# Patient Record
Sex: FEMALE | Race: WHITE | NOT HISPANIC OR LATINO | URBAN - METROPOLITAN AREA
[De-identification: names, ages, dates, MRNs, and addresses within clinical notes are randomized per-mention and may not be internally consistent; named-entity substitution may affect disease eponyms.]

---

## 2017-01-23 ENCOUNTER — IMPORTED ENCOUNTER (OUTPATIENT)
Dept: URBAN - METROPOLITAN AREA CLINIC 31 | Facility: CLINIC | Age: 67
End: 2017-01-23

## 2017-01-23 PROBLEM — H40.1111: Noted: 2017-01-23

## 2017-01-23 PROBLEM — H25.043: Noted: 2017-01-23

## 2017-01-23 PROBLEM — H40.1122: Noted: 2017-01-23

## 2017-01-23 PROBLEM — H43.811: Noted: 2017-01-23

## 2017-01-23 PROCEDURE — 92004 COMPRE OPH EXAM NEW PT 1/>: CPT

## 2017-01-23 NOTE — PATIENT DISCUSSION
1.  Discussed the risks benefits alternatives and limitations of cataract surgery including infection bleeding loss of vision retinal tears detachment. The patient stated a full understanding and a desire to proceed with the procedure in both eyes. Refractive options were reviewed. Patient has elected to be optomized for distance vision in both eyes. The patient will still need glasses for reading and to possibly fine tune distance vision. Standard vs MFIOL discussed. Can only do MFIOL if VF shows mild changes. Pt having side effects with Travatan would recommend iStent at time of cataract surgery. Schedule KPE/IOL OS/OD with iStent get glaucoma records VF OCT2. Primary open angle glaucoma OD:  Continue with current treatment plan. Discussed importance of compliance. Will continue to monitor. 3.  Primary open angle glaucoma OS - Continue with current treatment plan. Discussed importance of compliance. Will continue to monitor. 4.  PVD OD: Patient was cautioned to call our office immediately if they experience a substantial change in their symptoms such as an increase in floaters persistent flashes loss of visual field (may appear as a shadow or a curtain) or decrease in visual acuity as these may indicate a retinal tear or detachment. If this is a new problem patient will need to return for re-examination  as determined by the [address] [place] [address]. Return for an appointment for [address] [place] [address] with Dr. Devin Khan.

## 2017-02-07 ENCOUNTER — IMPORTED ENCOUNTER (OUTPATIENT)
Dept: URBAN - METROPOLITAN AREA CLINIC 31 | Facility: CLINIC | Age: 67
End: 2017-02-07

## 2017-02-07 PROBLEM — H25.813: Noted: 2017-02-07

## 2017-02-07 PROCEDURE — 92286 ANT SGM IMG I&R SPECLR MIC: CPT

## 2017-02-07 PROCEDURE — 92134 CPTRZ OPH DX IMG PST SGM RTA: CPT

## 2017-02-07 PROCEDURE — 76519 ECHO EXAM OF EYE: CPT

## 2017-02-07 NOTE — PATIENT DISCUSSION
Discussed the risks benefits alternatives and limitations of cataract surgery including infection bleeding loss of vision retinal tears detachment. The patient stated a full understanding and a desire to proceed with the procedure in the right eye. Refractive options were reviewed. Pt has elected MFIOL with Femto assist and ORA guidance to optimize lens power choice. The pt may still need glasses to possibly fine tune uncorrected vision. The patient understands the risk of glare and halo at night.

## 2017-02-28 ENCOUNTER — IMPORTED ENCOUNTER (OUTPATIENT)
Dept: URBAN - METROPOLITAN AREA CLINIC 31 | Facility: CLINIC | Age: 67
End: 2017-02-28

## 2017-02-28 PROBLEM — Z96.1: Noted: 2017-02-28

## 2017-02-28 PROCEDURE — 99024 POSTOP FOLLOW-UP VISIT: CPT

## 2017-02-28 NOTE — PATIENT DISCUSSION
Post-Op Day #1 - Cataract Surgery Left Eye (OS) - doing well. Tears prn. Continue postop drops as directed. Call office with symptoms of pain redness or decreased vision in operative eye.  1 drop of zylet instilleds/p iStent IOP doing well. Return for an appointment in 1 week for post op exam. with Dr. Francoise Kern.

## 2017-03-07 ENCOUNTER — IMPORTED ENCOUNTER (OUTPATIENT)
Dept: URBAN - METROPOLITAN AREA CLINIC 31 | Facility: CLINIC | Age: 67
End: 2017-03-07

## 2017-03-07 PROBLEM — Z96.1: Noted: 2017-03-07

## 2017-03-07 PROCEDURE — 99024 POSTOP FOLLOW-UP VISIT: CPT

## 2017-03-07 NOTE — PATIENT DISCUSSION
1.  Post-Op Day #1 - Cataract Surgery Right Eye (OD) - doing well. Tears prn. Continue postop drops as directed. Call office with symptoms of pain redness or decreased vision in operative eye. 1 drop zylet instilled. 2. Return for an appointment in 1 week for post op exam. What to do with Dr. Mari Vaughan.  CHECK UNCORRECTED READING AND DISTANCE

## 2017-03-14 ENCOUNTER — IMPORTED ENCOUNTER (OUTPATIENT)
Dept: URBAN - METROPOLITAN AREA CLINIC 31 | Facility: CLINIC | Age: 67
End: 2017-03-14

## 2017-03-14 PROBLEM — Z96.1: Noted: 2017-03-14

## 2017-03-14 PROBLEM — H40.1131: Noted: 2017-03-14

## 2017-03-14 PROCEDURE — 99024 POSTOP FOLLOW-UP VISIT: CPT

## 2017-03-14 NOTE — PATIENT DISCUSSION
1.  Post-Op Week #1 - Cataract Surgery Right Eye (OD) - Intraocular lens stable and surgery very well healed. Patient to resume all normal activities. Finish postop drops as directed. Final Refraction given if necessary. Add ATS 3-4xd recheck mrx in 1 monthCont. Travatan for 2 more weeks then DC2. Post-Op Week #2 - Cataract Surgery Left Eye (OS) -  Intraocular lens stable and surgery very well healed. Patient to resume all normal activities. Finish postop drops as directed. Final Refraction given if necessary. Cont. Travatan for 2 more weeks then [facility]. Primary open angle glaucoma OU - Continue with current treatment plan. Discussed importance of compliance. Will continue to monitor. s/p iStent continue Travatan for 2 weeks then trial DC reviewed FDA study of iStent. Pt will still need monitoring of glaucoma and yearly VF and OCT4. Return for an appointment in 1 month for post op exam. MRx topography. with Dr. Devin Khan.

## 2017-04-18 ENCOUNTER — IMPORTED ENCOUNTER (OUTPATIENT)
Dept: URBAN - METROPOLITAN AREA CLINIC 31 | Facility: CLINIC | Age: 67
End: 2017-04-18

## 2017-04-18 PROBLEM — Z96.1: Noted: 2017-04-18

## 2017-04-18 PROBLEM — H40.1131: Noted: 2017-04-18

## 2017-04-18 PROCEDURE — 99024 POSTOP FOLLOW-UP VISIT: CPT

## 2017-04-18 NOTE — PATIENT DISCUSSION
1.  Post-Op Cataract Surgery 15-90 days Both Eyes (OU)-  Doing well with stable vision. Tears frequent. 2. Primary open angle glaucoma OU - s/p iStent IOP excellent 3. Return for an appointment in 3 months for dilated fundus exam. with Dr. Santana Ni.

## 2017-07-21 ENCOUNTER — IMPORTED ENCOUNTER (OUTPATIENT)
Dept: URBAN - METROPOLITAN AREA CLINIC 31 | Facility: CLINIC | Age: 67
End: 2017-07-21

## 2017-07-21 PROBLEM — H26.493: Noted: 2017-07-21

## 2017-07-21 PROBLEM — Z96.1: Noted: 2017-07-21

## 2017-07-21 PROCEDURE — 99214 OFFICE O/P EST MOD 30 MIN: CPT

## 2017-07-21 NOTE — PATIENT DISCUSSION
1.  Pseudophakia OU - IOLs stable. Monitor. 2. PCO OU: (Posterior Capsule Opacification)  Not visually significant at this time. Monitor for yag capsulotomy necessity. If striae do not resolve t/c yag. Return for an appointment in 6 weeks for dilated fundus exam. BAT. with Dr. Concetta Kennedy.

## 2017-10-25 ENCOUNTER — IMPORTED ENCOUNTER (OUTPATIENT)
Dept: URBAN - METROPOLITAN AREA CLINIC 31 | Facility: CLINIC | Age: 67
End: 2017-10-25

## 2017-10-25 PROBLEM — H26.493: Noted: 2017-10-25

## 2017-10-25 PROCEDURE — 92134 CPTRZ OPH DX IMG PST SGM RTA: CPT

## 2017-10-25 PROCEDURE — 99214 OFFICE O/P EST MOD 30 MIN: CPT

## 2017-10-25 NOTE — PATIENT DISCUSSION
PCO  OU (Posterior Capsule Opacification)   PCO is visually significant and impairment of vision does not meet the patient’s functional needs or interferes with activities of daily living. Risks benefits and alternatives to the Nd:YAG Laser reviewed including elevated IOP immediately postop and retinal tear/detachment. Patient to notify their ophthalmologist promptly if they have a significant change in symptoms such as flashes of light (photopsia) an increase in floaters loss of visual field or decrease in visual acuity after the procedure. Patient will be scheduled in [place] [id number] for Nd:YAG Laser.  Schedule Yag OD/OS

## 2017-11-21 ENCOUNTER — IMPORTED ENCOUNTER (OUTPATIENT)
Dept: URBAN - METROPOLITAN AREA CLINIC 31 | Facility: CLINIC | Age: 67
End: 2017-11-21

## 2017-11-21 PROBLEM — H40.1131: Noted: 2017-11-21

## 2017-11-21 PROBLEM — Z98.89: Noted: 2017-11-21

## 2017-11-21 PROBLEM — H04.123: Noted: 2017-11-21

## 2017-11-21 PROBLEM — Z96.1: Noted: 2017-11-21

## 2017-11-21 PROCEDURE — 99024 POSTOP FOLLOW-UP VISIT: CPT

## 2017-11-21 NOTE — PATIENT DISCUSSION
1.  s/p YAG laser capsulotomy for Posterior Capsule Opacification (PCO) in Both Eyes (OU). Please contact us if you have a significant change in symptoms such as flashes of light (photopsia) increased floaters decrease/loss of visual field or visual acuity. 2. Dry Eye OU: Pt takes antihistamines for allergies and cannot stop. Start Restasis BID. Discussed that Restasis helps to increase the production of the patient's own tears and therefore can take 3-4 months for an improvement in symptoms. Use AT q3-43. Pseudophakia OU -  residual refractive error affecting vision. Recommend possible PRK need to treat dryness first.Return for an appointment in 1 month for refractive consult. with Dr. Edith Mishra. 4.  Primary open angle glaucoma OU - s/p iStent IOP excellent Discussed importance of compliance. Will continue to monitor.

## 2017-12-26 ENCOUNTER — IMPORTED ENCOUNTER (OUTPATIENT)
Dept: URBAN - METROPOLITAN AREA CLINIC 31 | Facility: CLINIC | Age: 67
End: 2017-12-26

## 2017-12-26 PROBLEM — H40.1131: Noted: 2017-12-26

## 2017-12-26 PROBLEM — Z96.1: Noted: 2017-12-26

## 2017-12-26 PROBLEM — H52.209: Noted: 2017-12-26

## 2017-12-26 PROBLEM — H16.223: Noted: 2017-12-26

## 2017-12-26 NOTE — PATIENT DISCUSSION
1.  We discussed risks vs. benefits of surgery. The patient has watched and understands the informed consent video. I have discussed the risks of laser refractive surgery with the patient. I informed the patient of the risk of infection (1:1000 for [address] ) I have discussed the possibility of postoperative halos starbursts and dryness. I reviewed the risk of corneal ectasia. We discussed the possibility of need for enhancement surgery. I have discussed the need for postoperative reading glasses. I have discussed the possibility for the patient to require glasses and/or contact lenses postoperatively for the clearest vision possible. I have answered all of the patients questions. Schedule PRK OS N/C2. Dry Eye OU:  Continue current management with Restasis. 3.  Pseudophakia OU - IOLs stable. Monitor. 4. Primary open angle glaucoma OU - Continue with current treatment plan. Discussed importance of compliance. Will continue to monitor.

## 2017-12-29 ENCOUNTER — IMPORTED ENCOUNTER (OUTPATIENT)
Dept: URBAN - METROPOLITAN AREA CLINIC 31 | Facility: CLINIC | Age: 67
End: 2017-12-29

## 2017-12-29 PROCEDURE — 66999 UNLISTED PX ANT SEGMENT EYE: CPT

## 2017-12-29 NOTE — PATIENT DISCUSSION
Surgery performed without difficulty. PO drops and instructions reviewed. Pt tolerated well and released to home in good condition. Return for an appointment in 1 day for post op exam. with Dr. Last Rmey.

## 2017-12-30 ENCOUNTER — IMPORTED ENCOUNTER (OUTPATIENT)
Dept: URBAN - METROPOLITAN AREA CLINIC 31 | Facility: CLINIC | Age: 67
End: 2017-12-30

## 2017-12-30 PROBLEM — H52.229: Noted: 2017-12-30

## 2017-12-30 PROCEDURE — 99024 POSTOP FOLLOW-UP VISIT: CPT

## 2017-12-30 NOTE — PATIENT DISCUSSION
s/p [address] doing well. PO drops as directed on instruction sheet. BCL to remain until surface healed tears prn. Call with any problems. Return for an appointment in 1 week for post op exam. with Dr. Francoise Kern.

## 2018-01-03 ENCOUNTER — IMPORTED ENCOUNTER (OUTPATIENT)
Dept: URBAN - METROPOLITAN AREA CLINIC 31 | Facility: CLINIC | Age: 68
End: 2018-01-03

## 2018-01-03 PROCEDURE — 99024 POSTOP FOLLOW-UP VISIT: CPT

## 2018-01-03 NOTE — PATIENT DISCUSSION
s/p [address] doing well. PO drops as directed on instruction sheet. BCL DC today restart restasis on Friday tears prn. Call with any problems. Return for an appointment in 1 week for post op exam. with Dr. Marisela Harman.

## 2018-01-10 ENCOUNTER — IMPORTED ENCOUNTER (OUTPATIENT)
Dept: URBAN - METROPOLITAN AREA CLINIC 31 | Facility: CLINIC | Age: 68
End: 2018-01-10

## 2018-01-10 PROCEDURE — 99024 POSTOP FOLLOW-UP VISIT: CPT

## 2018-01-10 NOTE — PATIENT DISCUSSION
s/p PRK for mild myopia astig post MF IOL. doing well. Mild haze. Increase Lotemak to TID for a week then po in a week. May decrease at that time.

## 2018-01-17 ENCOUNTER — IMPORTED ENCOUNTER (OUTPATIENT)
Dept: URBAN - METROPOLITAN AREA CLINIC 31 | Facility: CLINIC | Age: 68
End: 2018-01-17

## 2018-01-17 PROBLEM — Z96.1: Noted: 2018-01-17

## 2018-01-17 PROCEDURE — 99024 POSTOP FOLLOW-UP VISIT: CPT

## 2018-02-01 ENCOUNTER — IMPORTED ENCOUNTER (OUTPATIENT)
Dept: URBAN - METROPOLITAN AREA CLINIC 31 | Facility: CLINIC | Age: 68
End: 2018-02-01

## 2018-02-01 PROCEDURE — 99024 POSTOP FOLLOW-UP VISIT: CPT

## 2018-03-01 ENCOUNTER — IMPORTED ENCOUNTER (OUTPATIENT)
Dept: URBAN - METROPOLITAN AREA CLINIC 31 | Facility: CLINIC | Age: 68
End: 2018-03-01

## 2018-03-01 PROBLEM — H17.89: Noted: 2018-03-01

## 2018-03-01 PROCEDURE — 99024 POSTOP FOLLOW-UP VISIT: CPT

## 2018-03-01 NOTE — PATIENT DISCUSSION
1.  s/p PRK Discussed in detail why patients vision is still blurry. Explained to make sure to use plenty of light when watching TV and reading. Will restart Lotemax qd OS. F/U one mos re-refract then. Cont ATs restasis. 2.   Refractive error

## 2018-04-12 ENCOUNTER — IMPORTED ENCOUNTER (OUTPATIENT)
Dept: URBAN - METROPOLITAN AREA CLINIC 31 | Facility: CLINIC | Age: 68
End: 2018-04-12

## 2018-04-12 PROBLEM — H17.89: Noted: 2018-04-12

## 2018-04-12 PROCEDURE — 99024 POSTOP FOLLOW-UP VISIT: CPT

## 2018-04-12 NOTE — PATIENT DISCUSSION
s/p [address] doing well. No more steroid. Cont NICKO drops.   OC 3 mos check cornea and IOP (S/P iStent)

## 2018-07-12 ENCOUNTER — IMPORTED ENCOUNTER (OUTPATIENT)
Dept: URBAN - METROPOLITAN AREA CLINIC 31 | Facility: CLINIC | Age: 68
End: 2018-07-12

## 2018-07-12 PROBLEM — H16.223: Noted: 2018-07-12

## 2018-07-12 PROBLEM — Z96.1: Noted: 2018-07-12

## 2018-07-12 PROCEDURE — 99213 OFFICE O/P EST LOW 20 MIN: CPT

## 2018-07-12 NOTE — PATIENT DISCUSSION
1.  Dry Eye OU:  Continue current management with Restasis. 2.  Pseudophakia OU - IOLs stable. Monitor. S/P Multifocal IOL OU Yag OU PRK OS with some regression. Rx SV distance. Dr. Henri pennington prn.3.   Glc with good IOP S/P iStent

## 2018-09-21 ENCOUNTER — IMPORTED ENCOUNTER (OUTPATIENT)
Dept: URBAN - METROPOLITAN AREA CLINIC 31 | Facility: CLINIC | Age: 68
End: 2018-09-21

## 2018-09-21 PROBLEM — Z96.1: Noted: 2018-09-21

## 2018-09-21 PROBLEM — H17.89: Noted: 2018-09-21

## 2018-09-21 PROBLEM — H40.1132: Noted: 2018-09-21

## 2018-09-21 PROBLEM — H04.123: Noted: 2018-09-21

## 2018-09-21 PROCEDURE — 92025 CPTRIZED CORNEAL TOPOGRAPHY: CPT

## 2018-09-21 PROCEDURE — 99213 OFFICE O/P EST LOW 20 MIN: CPT

## 2018-09-21 NOTE — PATIENT DISCUSSION
s/p PRK t/c enhancement when surface is stableReturn for an appointment in 1 month for office call. ARx MRx and Topography. with Dr. Edith Mishra.

## 2018-09-21 NOTE — PATIENT DISCUSSION
1.  Dry Eye OU: STill symptomatic on restasis. Start Xiidra BID. Discussed that Ranjeet Maillard helps to increase the production of the patient's own tears and therefore can take 3-4 months for an improvement in symptoms. Use AT frequent. 2. Pseudophakia OU - IOLs stable. Monitor. ReStor ou vision limited by surface and residual refractive error3.  s/p PRK t/c enhancement when surface is stable4. Glaucoma stable on dropsReturn for an appointment in 1 month for office call. ARx MRx and Topography. with Dr. Alicia Corona.

## 2018-09-21 NOTE — PATIENT DISCUSSION
Pseudophakia OU - IOLs stable. Monitor.  ReStor ou vision limited by surface and residual refractive error

## 2018-10-24 ENCOUNTER — IMPORTED ENCOUNTER (OUTPATIENT)
Dept: URBAN - METROPOLITAN AREA CLINIC 31 | Facility: CLINIC | Age: 68
End: 2018-10-24

## 2018-10-24 PROBLEM — H16.223: Noted: 2018-10-24

## 2018-10-24 PROBLEM — Z96.1: Noted: 2018-10-24

## 2018-10-24 PROCEDURE — 99213 OFFICE O/P EST LOW 20 MIN: CPT

## 2018-10-24 PROCEDURE — 92025 CPTRIZED CORNEAL TOPOGRAPHY: CPT

## 2018-10-24 NOTE — PATIENT DISCUSSION
1.  Dry Eye OU:  Continue current management with Xiidra and tears prn.  2.  Pseudophakia OU - IOLs stable. Monitor. t/c [address] if not better after dryness improvesReturn for an appointment in 6 weeks for refractive consult. with Dr. Terrie Graves.

## 2018-10-24 NOTE — PATIENT DISCUSSION
Pseudophakia OU - IOLs stable. Monitor. t/c [address] if not better after dryness improvesReturn for an appointment in 6 weeks for refractive consult. with Dr. Yarely Payan.

## 2018-11-19 ENCOUNTER — APPOINTMENT (RX ONLY)
Dept: URBAN - METROPOLITAN AREA CLINIC 148 | Facility: CLINIC | Age: 68
Setting detail: DERMATOLOGY
End: 2018-11-19

## 2018-11-19 DIAGNOSIS — D22 MELANOCYTIC NEVI: ICD-10-CM

## 2018-11-19 DIAGNOSIS — Z85.828 PERSONAL HISTORY OF OTHER MALIGNANT NEOPLASM OF SKIN: ICD-10-CM

## 2018-11-19 DIAGNOSIS — Z87.2 PERSONAL HISTORY OF DISEASES OF THE SKIN AND SUBCUTANEOUS TISSUE: ICD-10-CM

## 2018-11-19 DIAGNOSIS — L81.5 LEUKODERMA, NOT ELSEWHERE CLASSIFIED: ICD-10-CM

## 2018-11-19 DIAGNOSIS — L81.4 OTHER MELANIN HYPERPIGMENTATION: ICD-10-CM

## 2018-11-19 DIAGNOSIS — L82.1 OTHER SEBORRHEIC KERATOSIS: ICD-10-CM

## 2018-11-19 PROBLEM — E05.90 THYROTOXICOSIS, UNSPECIFIED WITHOUT THYROTOXIC CRISIS OR STORM: Status: ACTIVE | Noted: 2018-11-19

## 2018-11-19 PROBLEM — H54.7 UNSPECIFIED VISUAL LOSS: Status: ACTIVE | Noted: 2018-11-19

## 2018-11-19 PROBLEM — D22.5 MELANOCYTIC NEVI OF TRUNK: Status: ACTIVE | Noted: 2018-11-19

## 2018-11-19 PROBLEM — I10 ESSENTIAL (PRIMARY) HYPERTENSION: Status: ACTIVE | Noted: 2018-11-19

## 2018-11-19 PROBLEM — M12.9 ARTHROPATHY, UNSPECIFIED: Status: ACTIVE | Noted: 2018-11-19

## 2018-11-19 PROBLEM — E78.5 HYPERLIPIDEMIA, UNSPECIFIED: Status: ACTIVE | Noted: 2018-11-19

## 2018-11-19 PROCEDURE — ? COUNSELING

## 2018-11-19 PROCEDURE — 99214 OFFICE O/P EST MOD 30 MIN: CPT

## 2018-11-19 ASSESSMENT — LOCATION SIMPLE DESCRIPTION DERM
LOCATION SIMPLE: RIGHT PRETIBIAL REGION
LOCATION SIMPLE: LOWER BACK
LOCATION SIMPLE: RIGHT POSTERIOR UPPER ARM
LOCATION SIMPLE: RIGHT UPPER BACK
LOCATION SIMPLE: CHEST
LOCATION SIMPLE: RIGHT CHEEK
LOCATION SIMPLE: RIGHT EYEBROW
LOCATION SIMPLE: LEFT POSTERIOR UPPER ARM
LOCATION SIMPLE: LEFT CHEEK
LOCATION SIMPLE: LEFT PRETIBIAL REGION
LOCATION SIMPLE: ABDOMEN

## 2018-11-19 ASSESSMENT — LOCATION ZONE DERM
LOCATION ZONE: FACE
LOCATION ZONE: TRUNK
LOCATION ZONE: ARM
LOCATION ZONE: LEG

## 2018-11-19 ASSESSMENT — LOCATION DETAILED DESCRIPTION DERM
LOCATION DETAILED: RIGHT SUPERIOR UPPER BACK
LOCATION DETAILED: LEFT DISTAL PRETIBIAL REGION
LOCATION DETAILED: SUPERIOR LUMBAR SPINE
LOCATION DETAILED: RIGHT MID-UPPER BACK
LOCATION DETAILED: EPIGASTRIC SKIN
LOCATION DETAILED: RIGHT DISTAL POSTERIOR UPPER ARM
LOCATION DETAILED: LEFT INFERIOR CENTRAL MALAR CHEEK
LOCATION DETAILED: RIGHT PROXIMAL PRETIBIAL REGION
LOCATION DETAILED: RIGHT CENTRAL EYEBROW
LOCATION DETAILED: RIGHT LATERAL DISTAL PRETIBIAL REGION
LOCATION DETAILED: RIGHT INFERIOR CENTRAL MALAR CHEEK
LOCATION DETAILED: LEFT PROXIMAL PRETIBIAL REGION
LOCATION DETAILED: RIGHT MEDIAL SUPERIOR CHEST
LOCATION DETAILED: LEFT PROXIMAL POSTERIOR UPPER ARM

## 2019-02-27 ENCOUNTER — IMPORTED ENCOUNTER (OUTPATIENT)
Dept: URBAN - METROPOLITAN AREA CLINIC 31 | Facility: CLINIC | Age: 69
End: 2019-02-27

## 2019-02-27 PROBLEM — H16.223: Noted: 2019-02-27

## 2019-02-27 PROBLEM — Z96.1: Noted: 2019-02-27

## 2019-02-27 PROCEDURE — 99213 OFFICE O/P EST LOW 20 MIN: CPT

## 2019-02-27 NOTE — PATIENT DISCUSSION
1.  Dry Eye OU:  Slowly improving Continue current management with Xiidra and tears prn. Add TPP today. 2. Pseudophakia OU - IOLs stable. Monitor. t/c [address] if not better after dryness improvesReturn for an appointment in 1 month for office call. MRx and Topography. with Dr. Cuong Galarza.  3.  Collagen Plug placed in bilateral lower lids without complication

## 2019-04-10 ENCOUNTER — IMPORTED ENCOUNTER (OUTPATIENT)
Dept: URBAN - METROPOLITAN AREA CLINIC 31 | Facility: CLINIC | Age: 69
End: 2019-04-10

## 2019-04-10 PROBLEM — Z96.1: Noted: 2019-04-10

## 2019-04-10 PROBLEM — H16.223: Noted: 2019-04-10

## 2019-04-10 PROCEDURE — 99213 OFFICE O/P EST LOW 20 MIN: CPT

## 2019-04-10 NOTE — PATIENT DISCUSSION
1.  Dry Eye OU:  Slowly improving Continue current management with Xiidra and tears prn. Add FML thera tears nutrition2. Pseudophakia OU - IOLs stable. Monitor. t/c [address] if not better after dryness improvesReturn for an appointment in 2 weeks for refractive consult. with Dr. Mari Vaughan.

## 2019-04-24 ENCOUNTER — IMPORTED ENCOUNTER (OUTPATIENT)
Dept: URBAN - METROPOLITAN AREA CLINIC 31 | Facility: CLINIC | Age: 69
End: 2019-04-24

## 2019-04-24 PROBLEM — Z96.1: Noted: 2019-04-24

## 2019-04-24 PROBLEM — H16.223: Noted: 2019-04-24

## 2019-04-24 PROBLEM — H40.1132: Noted: 2019-04-24

## 2019-04-24 PROCEDURE — 92025 CPTRIZED CORNEAL TOPOGRAPHY: CPT

## 2019-04-24 PROCEDURE — 99213 OFFICE O/P EST LOW 20 MIN: CPT

## 2019-04-24 NOTE — PATIENT DISCUSSION
1.  Dry Eye OU:  Continue current management with Xiidra FML and tears prn.  2.  Pseudophakia OU - IOLs stable. Monitor. Restor t/c PRK for residual refractive error if not better with treating dryness3. Primary Open Angle Glaucoma OU moderate - s/p iStent IOP excellent. Discussed importance of compliance. Will continue to monitor. 4.  Return for an appointment in 6 weeks for office call. VF 24-2. MRx and Topography. with Dr. Terrie Graves.

## 2019-04-24 NOTE — PATIENT DISCUSSION
Primary Open Angle Glaucoma OU moderate - s/p iStent IOP excellent. Discussed importance of compliance. Will continue to monitor.

## 2019-04-24 NOTE — PATIENT DISCUSSION
Pseudophakia OU - IOLs stable. Monitor.  Restor t/c [address] for residual refractive error if not better with treating dryness

## 2019-04-24 NOTE — PATIENT DISCUSSION
Return for an appointment in 6 weeks for office call. VF 24-2. MRx and Topography. with Dr. Denver Curb.

## 2019-06-14 ENCOUNTER — APPOINTMENT (RX ONLY)
Dept: URBAN - METROPOLITAN AREA CLINIC 148 | Facility: CLINIC | Age: 69
Setting detail: DERMATOLOGY
End: 2019-06-14

## 2019-06-14 DIAGNOSIS — L82.1 OTHER SEBORRHEIC KERATOSIS: ICD-10-CM

## 2019-06-14 DIAGNOSIS — L57.0 ACTINIC KERATOSIS: ICD-10-CM

## 2019-06-14 DIAGNOSIS — D22 MELANOCYTIC NEVI: ICD-10-CM

## 2019-06-14 DIAGNOSIS — Z85.828 PERSONAL HISTORY OF OTHER MALIGNANT NEOPLASM OF SKIN: ICD-10-CM

## 2019-06-14 DIAGNOSIS — Z87.2 PERSONAL HISTORY OF DISEASES OF THE SKIN AND SUBCUTANEOUS TISSUE: ICD-10-CM

## 2019-06-14 DIAGNOSIS — L70.8 OTHER ACNE: ICD-10-CM

## 2019-06-14 DIAGNOSIS — L81.5 LEUKODERMA, NOT ELSEWHERE CLASSIFIED: ICD-10-CM

## 2019-06-14 DIAGNOSIS — L81.4 OTHER MELANIN HYPERPIGMENTATION: ICD-10-CM

## 2019-06-14 PROBLEM — D22.5 MELANOCYTIC NEVI OF TRUNK: Status: ACTIVE | Noted: 2019-06-14

## 2019-06-14 PROCEDURE — ? LIQUID NITROGEN

## 2019-06-14 PROCEDURE — ? INVENTORY

## 2019-06-14 PROCEDURE — 17003 DESTRUCT PREMALG LES 2-14: CPT

## 2019-06-14 PROCEDURE — 99214 OFFICE O/P EST MOD 30 MIN: CPT | Mod: 25

## 2019-06-14 PROCEDURE — ? COUNSELING

## 2019-06-14 PROCEDURE — 17000 DESTRUCT PREMALG LESION: CPT

## 2019-06-14 ASSESSMENT — LOCATION DETAILED DESCRIPTION DERM
LOCATION DETAILED: LEFT DISTAL PRETIBIAL REGION
LOCATION DETAILED: LEFT SUPERIOR UPPER BACK
LOCATION DETAILED: RIGHT LATERAL DISTAL PRETIBIAL REGION
LOCATION DETAILED: LEFT PROXIMAL PRETIBIAL REGION
LOCATION DETAILED: RIGHT CENTRAL EYEBROW
LOCATION DETAILED: LEFT PROXIMAL POSTERIOR UPPER ARM
LOCATION DETAILED: RIGHT SUPERIOR LATERAL MIDBACK
LOCATION DETAILED: RIGHT DISTAL POSTERIOR UPPER ARM
LOCATION DETAILED: LEFT PROXIMAL DORSAL FOREARM
LOCATION DETAILED: RIGHT PROXIMAL PRETIBIAL REGION
LOCATION DETAILED: EPIGASTRIC SKIN
LOCATION DETAILED: RIGHT SUPERIOR UPPER BACK
LOCATION DETAILED: LEFT PROXIMAL CALF

## 2019-06-14 ASSESSMENT — LOCATION SIMPLE DESCRIPTION DERM
LOCATION SIMPLE: ABDOMEN
LOCATION SIMPLE: LEFT POSTERIOR UPPER ARM
LOCATION SIMPLE: LEFT CALF
LOCATION SIMPLE: RIGHT POSTERIOR UPPER ARM
LOCATION SIMPLE: LEFT PRETIBIAL REGION
LOCATION SIMPLE: LEFT FOREARM
LOCATION SIMPLE: RIGHT PRETIBIAL REGION
LOCATION SIMPLE: RIGHT EYEBROW
LOCATION SIMPLE: RIGHT LOWER BACK
LOCATION SIMPLE: RIGHT UPPER BACK
LOCATION SIMPLE: LEFT UPPER BACK

## 2019-06-14 ASSESSMENT — LOCATION ZONE DERM
LOCATION ZONE: TRUNK
LOCATION ZONE: FACE
LOCATION ZONE: LEG
LOCATION ZONE: ARM

## 2019-07-10 ENCOUNTER — IMPORTED ENCOUNTER (OUTPATIENT)
Dept: URBAN - METROPOLITAN AREA CLINIC 31 | Facility: CLINIC | Age: 69
End: 2019-07-10

## 2019-07-10 PROBLEM — Z96.1: Noted: 2019-07-10

## 2019-07-10 PROBLEM — H04.123: Noted: 2019-07-10

## 2019-07-10 PROBLEM — H40.1131: Noted: 2019-07-10

## 2019-07-10 PROCEDURE — A4263 PERMANENT TEAR DUCT PLUG: HCPCS

## 2019-07-10 PROCEDURE — 92025 CPTRIZED CORNEAL TOPOGRAPHY: CPT

## 2019-07-10 PROCEDURE — 92083 EXTENDED VISUAL FIELD XM: CPT

## 2019-07-10 PROCEDURE — 99213 OFFICE O/P EST LOW 20 MIN: CPT

## 2019-07-10 NOTE — PATIENT DISCUSSION
Primary open angle glaucoma OU mild - VF stable. Continue with current treatment plan. Discussed importance of compliance. Will continue to monitor.

## 2019-07-10 NOTE — PATIENT DISCUSSION
1.  Pt did better after TPP and is now drier. plan PPP today form fit. Risks and benefits of punctal plugs reviewed with patient. Recommend punctal plugs and continued topical therapy2. Pseudophakia OU - IOLs stable. Monitor. s/p ReStor t/c PRK when dryness better3. Primary open angle glaucoma OU mild - VF stable. Continue with current treatment plan. Discussed importance of compliance. Will continue to monitor. 4.  Return for an appointment in 6 weeks for office call. MRx. with Dr. Camilla Crigler.

## 2019-08-28 ENCOUNTER — IMPORTED ENCOUNTER (OUTPATIENT)
Dept: URBAN - METROPOLITAN AREA CLINIC 31 | Facility: CLINIC | Age: 69
End: 2019-08-28

## 2019-08-28 PROBLEM — H16.223: Noted: 2019-08-28

## 2019-08-28 PROBLEM — H40.1132: Noted: 2019-08-28

## 2019-08-28 PROBLEM — Z96.1: Noted: 2019-08-28

## 2019-08-28 PROCEDURE — 99213 OFFICE O/P EST LOW 20 MIN: CPT

## 2019-08-28 PROCEDURE — 92015 DETERMINE REFRACTIVE STATE: CPT

## 2019-08-28 NOTE — PATIENT DISCUSSION
1.  Dry Eye OU:  Continue current management with Xiidra and tears. Much improved after PPP. 2. Pseudophakia OU - IOLs stable. Monitor. Restor uncorrected vision affected by residual refractive error. Risks and benefits of [address] discussed and reviewed. Pt liked vision with trial frame RX in glasses. Schedule PRK OD valium 5mg. Pt will be gone for a while will schedule when returns3. Primary Open Angle Glaucoma OU moderate - Continue with current treatment plan. Discussed importance of compliance. Will continue to monitor. 4.  Return for an appointment in 2 months for office call. MRx and Topography. with Dr. Barbar Castleman.

## 2019-08-28 NOTE — PATIENT DISCUSSION
Pseudophakia OU - IOLs stable. Monitor. Restor uncorrected vision affected by residual refractive error. Risks and benefits of [address] discussed and reviewed. Pt liked vision with trial frame RX in glasses.   Schedule PRK OD valium 5mg

## 2019-12-06 ENCOUNTER — IMPORTED ENCOUNTER (OUTPATIENT)
Dept: URBAN - METROPOLITAN AREA CLINIC 31 | Facility: CLINIC | Age: 69
End: 2019-12-06

## 2019-12-06 PROBLEM — H40.1131: Noted: 2019-12-06

## 2019-12-06 PROBLEM — Z96.1: Noted: 2019-12-06

## 2019-12-06 PROBLEM — H16.223: Noted: 2019-12-06

## 2019-12-06 PROBLEM — H52.209: Noted: 2019-12-06

## 2019-12-06 PROCEDURE — 99213 OFFICE O/P EST LOW 20 MIN: CPT

## 2019-12-06 PROCEDURE — 92025 CPTRIZED CORNEAL TOPOGRAPHY: CPT

## 2019-12-06 PROCEDURE — 92015 DETERMINE REFRACTIVE STATE: CPT

## 2019-12-06 NOTE — PATIENT DISCUSSION
Primary open angle glaucoma OU mild - Continue with current treatment plan. Discussed importance of compliance. Will continue to monitor for stability or progression.

## 2019-12-06 NOTE — PATIENT DISCUSSION
1.  We discussed risks vs. benefits of surgery. The patient has watched and understands the informed consent video. I have discussed the risks of laser refractive surgery with the patient. I informed the patient of the risk of infection (1:1000 for PRK and 1:3000 to 1:5000 for LASIK.)  I informed the patient of the possibility of complications related to creating the flap during LASIK surgery and that such complications may require that completion of their procedure be delayed for months. I have discussed the possibility of postoperative halos starbursts and dryness. I reviewed the risk of corneal ectasia. We discussed the possibility of need for enhancement surgery. I have discussed the need for postoperative reading glasses. I have discussed the possibility for the patient to require glasses and/or contact lenses postoperatively for the clearest vision possible. I have answered all of the patients questions. pt scheduled for [address] OD would treat -[phone]. Pseudophakia OU - IOLs stable. Monitorfor changes in vision. 3. Dry Eye OU:  Continue current management with Xiidra and tears prn. 4.  Primary open angle glaucoma OU mild - Continue with current treatment plan. Discussed importance of compliance. Will continue to monitor for stability or progression.

## 2019-12-13 ENCOUNTER — APPOINTMENT (RX ONLY)
Dept: URBAN - METROPOLITAN AREA CLINIC 148 | Facility: CLINIC | Age: 69
Setting detail: DERMATOLOGY
End: 2019-12-13

## 2019-12-13 DIAGNOSIS — D485 NEOPLASM OF UNCERTAIN BEHAVIOR OF SKIN: ICD-10-CM

## 2019-12-13 DIAGNOSIS — L57.0 ACTINIC KERATOSIS: ICD-10-CM

## 2019-12-13 DIAGNOSIS — L81.5 LEUKODERMA, NOT ELSEWHERE CLASSIFIED: ICD-10-CM

## 2019-12-13 DIAGNOSIS — L81.4 OTHER MELANIN HYPERPIGMENTATION: ICD-10-CM

## 2019-12-13 DIAGNOSIS — D22 MELANOCYTIC NEVI: ICD-10-CM

## 2019-12-13 DIAGNOSIS — D18.0 HEMANGIOMA: ICD-10-CM

## 2019-12-13 DIAGNOSIS — L82.1 OTHER SEBORRHEIC KERATOSIS: ICD-10-CM

## 2019-12-13 PROBLEM — D22.5 MELANOCYTIC NEVI OF TRUNK: Status: ACTIVE | Noted: 2019-12-13

## 2019-12-13 PROBLEM — D18.01 HEMANGIOMA OF SKIN AND SUBCUTANEOUS TISSUE: Status: ACTIVE | Noted: 2019-12-13

## 2019-12-13 PROBLEM — D48.5 NEOPLASM OF UNCERTAIN BEHAVIOR OF SKIN: Status: ACTIVE | Noted: 2019-12-13

## 2019-12-13 PROCEDURE — 11102 TANGNTL BX SKIN SINGLE LES: CPT

## 2019-12-13 PROCEDURE — 17000 DESTRUCT PREMALG LESION: CPT | Mod: 59

## 2019-12-13 PROCEDURE — ? BIOPSY BY SHAVE METHOD

## 2019-12-13 PROCEDURE — ? COUNSELING

## 2019-12-13 PROCEDURE — 17003 DESTRUCT PREMALG LES 2-14: CPT

## 2019-12-13 PROCEDURE — ? ADDITIONAL NOTES

## 2019-12-13 PROCEDURE — 99213 OFFICE O/P EST LOW 20 MIN: CPT | Mod: 25

## 2019-12-13 PROCEDURE — ? LIQUID NITROGEN

## 2019-12-13 PROCEDURE — ? FULL BODY SKIN EXAM

## 2019-12-13 ASSESSMENT — LOCATION DETAILED DESCRIPTION DERM
LOCATION DETAILED: RIGHT MID-UPPER BACK
LOCATION DETAILED: LEFT PROXIMAL CALF
LOCATION DETAILED: LEFT VENTRAL PROXIMAL FOREARM
LOCATION DETAILED: LEFT PROXIMAL CALF
LOCATION DETAILED: RIGHT PROXIMAL PRETIBIAL REGION
LOCATION DETAILED: RIGHT VENTRAL PROXIMAL FOREARM
LOCATION DETAILED: LEFT NASAL SIDEWALL
LOCATION DETAILED: INFERIOR THORACIC SPINE
LOCATION DETAILED: RIGHT SUPERIOR MEDIAL UPPER BACK
LOCATION DETAILED: LEFT PROXIMAL PRETIBIAL REGION
LOCATION DETAILED: LEFT DISTAL DORSAL FOREARM

## 2019-12-13 ASSESSMENT — LOCATION ZONE DERM
LOCATION ZONE: ARM
LOCATION ZONE: LEG
LOCATION ZONE: NOSE
LOCATION ZONE: TRUNK
LOCATION ZONE: LEG

## 2019-12-13 ASSESSMENT — LOCATION SIMPLE DESCRIPTION DERM
LOCATION SIMPLE: UPPER BACK
LOCATION SIMPLE: RIGHT FOREARM
LOCATION SIMPLE: RIGHT UPPER BACK
LOCATION SIMPLE: LEFT NOSE
LOCATION SIMPLE: LEFT FOREARM
LOCATION SIMPLE: LEFT PRETIBIAL REGION
LOCATION SIMPLE: LEFT CALF
LOCATION SIMPLE: RIGHT PRETIBIAL REGION
LOCATION SIMPLE: LEFT CALF

## 2019-12-13 NOTE — PROCEDURE: BIOPSY BY SHAVE METHOD
Dressing: bandage
Hide Topical Anesthesia?: No
Anesthesia Volume In Cc (Will Not Render If 0): 0.5
Was A Bandage Applied: Yes
Biopsy Method: Dermablade
Hemostasis: Aluminum Chloride
Detail Level: Detailed
Lab: [id number]
Billing Type: Third-Party Bill
Lab Facility: [id number]
Size Of Lesion In Cm: 0
Anesthesia Type: 1% lidocaine with epinephrine
Wound Care: Vaseline
Biopsy Type: H and E
Depth Of Biopsy: dermis
Type Of Destruction Used: Curettage

## 2019-12-13 NOTE — PROCEDURE: LIQUID NITROGEN
Detail Level: Simple
Render Note In Bullet Format When Appropriate: No
Consent: The patient's consent was obtained including but not limited to risks of crusting, scabbing, blistering, scarring, darker or lighter pigmentary change, recurrence, incomplete removal and infection.
Number Of Freeze-Thaw Cycles: 2 freeze-thaw cycles
Post-Care Instructions: I reviewed with the patient in detail post-care instructions. Patient is to wear sunprotection, and avoid picking at any of the treated lesions. Pt may apply Vaseline to crusted or scabbing areas.
Duration Of Freeze Thaw-Cycle (Seconds): 3
Detail Level: Zone

## 2019-12-20 ENCOUNTER — IMPORTED ENCOUNTER (OUTPATIENT)
Dept: URBAN - METROPOLITAN AREA CLINIC 31 | Facility: CLINIC | Age: 69
End: 2019-12-20

## 2019-12-20 PROCEDURE — 66999 UNLISTED PX ANT SEGMENT EYE: CPT

## 2019-12-21 ENCOUNTER — IMPORTED ENCOUNTER (OUTPATIENT)
Dept: URBAN - METROPOLITAN AREA CLINIC 31 | Facility: CLINIC | Age: 69
End: 2019-12-21

## 2019-12-21 PROBLEM — H52.229: Noted: 2019-12-21

## 2019-12-21 PROCEDURE — 99024 POSTOP FOLLOW-UP VISIT: CPT

## 2019-12-21 NOTE — PATIENT DISCUSSION
s/p [address] doing well. PO drops as directed on instruction sheet. BCL to remain until surface healed tears prn. Call with any problems. Return for an appointment in 1 week for post op exam. with Dr. Christina Davenport.

## 2019-12-26 ENCOUNTER — IMPORTED ENCOUNTER (OUTPATIENT)
Dept: URBAN - METROPOLITAN AREA CLINIC 31 | Facility: CLINIC | Age: 69
End: 2019-12-26

## 2019-12-26 PROCEDURE — 99024 POSTOP FOLLOW-UP VISIT: CPT

## 2019-12-26 NOTE — PATIENT DISCUSSION
s/p [address] doing well. PO drops as directed on instruction sheet. BCL removed today without incident OD tears prn. Call with any problems. FEP 2 weeks

## 2020-01-08 ENCOUNTER — IMPORTED ENCOUNTER (OUTPATIENT)
Dept: URBAN - METROPOLITAN AREA CLINIC 31 | Facility: CLINIC | Age: 70
End: 2020-01-08

## 2020-01-08 PROCEDURE — 99024 POSTOP FOLLOW-UP VISIT: CPT

## 2020-01-08 NOTE — PATIENT DISCUSSION
s/p [address] doing well. PRED qd Xiidra 2x/d tears prn. Call with any problems. Return for an appointment in 3 weeks for post op refraction. with Dr. Danya Preciado.

## 2020-02-14 ENCOUNTER — IMPORTED ENCOUNTER (OUTPATIENT)
Dept: URBAN - METROPOLITAN AREA CLINIC 31 | Facility: CLINIC | Age: 70
End: 2020-02-14

## 2020-02-14 PROCEDURE — 99024 POSTOP FOLLOW-UP VISIT: CPT

## 2020-02-14 NOTE — PATIENT DISCUSSION
s/p [address] doing well.  vision limited by dryness DC PRED FML ou qd xiidra 2x/d tears prn. Call with any problems. Return for an appointment in 1 month for office call. MRx and Topography. with Dr. Evans Cuevas.

## 2020-06-16 ENCOUNTER — APPOINTMENT (RX ONLY)
Dept: URBAN - METROPOLITAN AREA CLINIC 148 | Facility: CLINIC | Age: 70
Setting detail: DERMATOLOGY
End: 2020-06-16

## 2020-06-16 DIAGNOSIS — L82.1 OTHER SEBORRHEIC KERATOSIS: ICD-10-CM

## 2020-06-16 DIAGNOSIS — L81.5 LEUKODERMA, NOT ELSEWHERE CLASSIFIED: ICD-10-CM

## 2020-06-16 DIAGNOSIS — D18.0 HEMANGIOMA: ICD-10-CM

## 2020-06-16 DIAGNOSIS — D22 MELANOCYTIC NEVI: ICD-10-CM

## 2020-06-16 DIAGNOSIS — L57.0 ACTINIC KERATOSIS: ICD-10-CM

## 2020-06-16 DIAGNOSIS — L81.4 OTHER MELANIN HYPERPIGMENTATION: ICD-10-CM

## 2020-06-16 PROBLEM — D22.39 MELANOCYTIC NEVI OF OTHER PARTS OF FACE: Status: ACTIVE | Noted: 2020-06-16

## 2020-06-16 PROBLEM — D18.01 HEMANGIOMA OF SKIN AND SUBCUTANEOUS TISSUE: Status: ACTIVE | Noted: 2020-06-16

## 2020-06-16 PROBLEM — D22.5 MELANOCYTIC NEVI OF TRUNK: Status: ACTIVE | Noted: 2020-06-16

## 2020-06-16 PROCEDURE — 99213 OFFICE O/P EST LOW 20 MIN: CPT | Mod: 25

## 2020-06-16 PROCEDURE — ? FULL BODY SKIN EXAM

## 2020-06-16 PROCEDURE — ? COUNSELING

## 2020-06-16 PROCEDURE — ? LIQUID NITROGEN

## 2020-06-16 PROCEDURE — 17003 DESTRUCT PREMALG LES 2-14: CPT

## 2020-06-16 PROCEDURE — ? PATHOLOGY DISCUSSION

## 2020-06-16 PROCEDURE — ? ADDITIONAL NOTES

## 2020-06-16 PROCEDURE — 17000 DESTRUCT PREMALG LESION: CPT

## 2020-06-16 ASSESSMENT — LOCATION ZONE DERM
LOCATION ZONE: TRUNK
LOCATION ZONE: FACE
LOCATION ZONE: LEG
LOCATION ZONE: NOSE
LOCATION ZONE: ARM
LOCATION ZONE: LEG

## 2020-06-16 ASSESSMENT — LOCATION SIMPLE DESCRIPTION DERM
LOCATION SIMPLE: LEFT CALF
LOCATION SIMPLE: LEFT FOREARM
LOCATION SIMPLE: LEFT ACHILLES SKIN
LOCATION SIMPLE: LEFT PRETIBIAL REGION
LOCATION SIMPLE: RIGHT FOREARM
LOCATION SIMPLE: RIGHT UPPER BACK
LOCATION SIMPLE: RIGHT PRETIBIAL REGION
LOCATION SIMPLE: UPPER BACK
LOCATION SIMPLE: RIGHT CHEEK
LOCATION SIMPLE: LEFT NOSE

## 2020-06-16 ASSESSMENT — LOCATION DETAILED DESCRIPTION DERM
LOCATION DETAILED: RIGHT LATERAL MALAR CHEEK
LOCATION DETAILED: LEFT PROXIMAL CALF
LOCATION DETAILED: LEFT ACHILLES SKIN
LOCATION DETAILED: RIGHT INFERIOR CENTRAL MALAR CHEEK
LOCATION DETAILED: RIGHT PROXIMAL PRETIBIAL REGION
LOCATION DETAILED: INFERIOR THORACIC SPINE
LOCATION DETAILED: RIGHT VENTRAL PROXIMAL FOREARM
LOCATION DETAILED: RIGHT MID-UPPER BACK
LOCATION DETAILED: LEFT VENTRAL PROXIMAL FOREARM
LOCATION DETAILED: LEFT PROXIMAL PRETIBIAL REGION
LOCATION DETAILED: LEFT NASAL SIDEWALL
LOCATION DETAILED: RIGHT SUPERIOR MEDIAL UPPER BACK

## 2020-06-16 NOTE — PROCEDURE: LIQUID NITROGEN
Post-Care Instructions: I reviewed with the patient in detail post-care instructions. Patient is to wear sunprotection, and avoid picking at any of the treated lesions. Pt may apply Vaseline to crusted or scabbing areas.
Consent: The patient's consent was obtained including but not limited to risks of crusting, scabbing, blistering, scarring, darker or lighter pigmentary change, recurrence, incomplete removal and infection.
Duration Of Freeze Thaw-Cycle (Seconds): 3
Render Note In Bullet Format When Appropriate: No
Detail Level: Detailed
Number Of Freeze-Thaw Cycles: 2 freeze-thaw cycles

## 2020-06-29 ENCOUNTER — IMPORTED ENCOUNTER (OUTPATIENT)
Dept: URBAN - METROPOLITAN AREA CLINIC 31 | Facility: CLINIC | Age: 70
End: 2020-06-29

## 2020-06-29 PROBLEM — Z96.1: Noted: 2020-06-29

## 2020-06-29 PROBLEM — H16.223: Noted: 2020-06-29

## 2020-06-29 PROCEDURE — 99213 OFFICE O/P EST LOW 20 MIN: CPT

## 2020-06-29 PROCEDURE — 92025 CPTRIZED CORNEAL TOPOGRAPHY: CPT

## 2020-06-29 NOTE — PATIENT DISCUSSION
Pseudophakia OU - IOLs stable. Monitor for changes in vision. s/p PRK enhancement DC FML tears 2-3x/dReturn for an appointment in 3 months for office call. MRx. with Dr. Daly De La Cruz.

## 2020-06-29 NOTE — PATIENT DISCUSSION
1.  Dry Eye OU:  Continue current management with Xiidra qd and tears prn.  2.  Pseudophakia OU - IOLs stable. Monitor for changes in vision. s/p PRK enhancement DC FML tears 2-3x/dReturn for an appointment in 3 months for office call. MRx. with Dr. Faith Landin.

## 2020-09-21 ENCOUNTER — IMPORTED ENCOUNTER (OUTPATIENT)
Dept: URBAN - METROPOLITAN AREA CLINIC 31 | Facility: CLINIC | Age: 70
End: 2020-09-21

## 2020-09-21 PROBLEM — H16.223: Noted: 2020-09-21

## 2020-09-21 PROBLEM — Z96.1: Noted: 2020-09-21

## 2020-09-21 PROBLEM — H40.1131: Noted: 2020-09-21

## 2020-09-21 PROCEDURE — 99213 OFFICE O/P EST LOW 20 MIN: CPT

## 2020-09-21 PROCEDURE — 92015 DETERMINE REFRACTIVE STATE: CPT

## 2020-09-21 NOTE — PATIENT DISCUSSION
1.  Dry Eye OU:  Continue current management with Xiidra and tears prn. Uses tear 2x/d on days when eyes are drier may need 4x/d2. Doing well with MFIOL. Monitor for changes in vision. 3. Primary open angle glaucoma OU mild - Continue with current treatment plan. Discussed importance of compliance. Will continue to monitor for stability or progression. 4. Return for an appointment in 4 months for pressure check. VF 24-2. with Dr. Daly De La Cruz.

## 2021-02-03 ENCOUNTER — IMPORTED ENCOUNTER (OUTPATIENT)
Dept: URBAN - METROPOLITAN AREA CLINIC 31 | Facility: CLINIC | Age: 71
End: 2021-02-03

## 2021-02-03 PROBLEM — Z96.1: Noted: 2021-02-03

## 2021-02-03 PROBLEM — H16.223: Noted: 2021-02-03

## 2021-02-03 PROBLEM — H40.1131: Noted: 2021-02-03

## 2021-02-03 PROCEDURE — 92083 EXTENDED VISUAL FIELD XM: CPT

## 2021-02-03 PROCEDURE — 99213 OFFICE O/P EST LOW 20 MIN: CPT

## 2021-02-03 NOTE — PATIENT DISCUSSION
MFIOL. OU  Glare and halos pm recommended Brimonidine 1 drop OU to help reduce appearances of hals/glare.

## 2021-02-03 NOTE — PATIENT DISCUSSION
1.  Primary open angle glaucoma OU mild - IOP good today and VF stable. Continue with current treatment plan. Discussed importance of compliance. Will continue to monitor for stability or progression. 2. Dry Eye OU:  Continue current management with Xiidra QD OU and tears prn.  3.   MFIOL. OU  Glare and halos pm recommended Brimonidine 0.2% 1 drop OU prior to going out at night to help reduce appearances of halos/glare. 4. Return for an appointment in 6 months for comprehensive exam. OCT Nerve. with Dr. Ana Grayson.

## 2021-02-15 ENCOUNTER — APPOINTMENT (RX ONLY)
Dept: URBAN - METROPOLITAN AREA CLINIC 148 | Facility: CLINIC | Age: 71
Setting detail: DERMATOLOGY
End: 2021-02-15

## 2021-02-15 VITALS — TEMPERATURE: 97.4 F

## 2021-02-15 DIAGNOSIS — D18.0 HEMANGIOMA: ICD-10-CM | Status: STABLE

## 2021-02-15 DIAGNOSIS — D22 MELANOCYTIC NEVI: ICD-10-CM | Status: STABLE

## 2021-02-15 DIAGNOSIS — L81.4 OTHER MELANIN HYPERPIGMENTATION: ICD-10-CM | Status: STABLE

## 2021-02-15 DIAGNOSIS — L82.1 OTHER SEBORRHEIC KERATOSIS: ICD-10-CM | Status: STABLE

## 2021-02-15 DIAGNOSIS — Z85.828 PERSONAL HISTORY OF OTHER MALIGNANT NEOPLASM OF SKIN: ICD-10-CM | Status: STABLE

## 2021-02-15 DIAGNOSIS — Z87.2 PERSONAL HISTORY OF DISEASES OF THE SKIN AND SUBCUTANEOUS TISSUE: ICD-10-CM | Status: STABLE

## 2021-02-15 DIAGNOSIS — L81.5 LEUKODERMA, NOT ELSEWHERE CLASSIFIED: ICD-10-CM | Status: STABLE

## 2021-02-15 DIAGNOSIS — L57.0 ACTINIC KERATOSIS: ICD-10-CM

## 2021-02-15 PROBLEM — D22.5 MELANOCYTIC NEVI OF TRUNK: Status: ACTIVE | Noted: 2021-02-15

## 2021-02-15 PROBLEM — D22.39 MELANOCYTIC NEVI OF OTHER PARTS OF FACE: Status: ACTIVE | Noted: 2021-02-15

## 2021-02-15 PROBLEM — D18.01 HEMANGIOMA OF SKIN AND SUBCUTANEOUS TISSUE: Status: ACTIVE | Noted: 2021-02-15

## 2021-02-15 PROCEDURE — 17000 DESTRUCT PREMALG LESION: CPT

## 2021-02-15 PROCEDURE — 99213 OFFICE O/P EST LOW 20 MIN: CPT | Mod: 25

## 2021-02-15 PROCEDURE — ? ADDITIONAL NOTES

## 2021-02-15 PROCEDURE — ? FULL BODY SKIN EXAM

## 2021-02-15 PROCEDURE — 17003 DESTRUCT PREMALG LES 2-14: CPT

## 2021-02-15 PROCEDURE — ? LIQUID NITROGEN

## 2021-02-15 PROCEDURE — ? SUNSCREEN RECOMMENDATIONS

## 2021-02-15 PROCEDURE — ? COUNSELING

## 2021-02-15 ASSESSMENT — LOCATION SIMPLE DESCRIPTION DERM
LOCATION SIMPLE: LEFT CALF
LOCATION SIMPLE: LEFT UPPER BACK
LOCATION SIMPLE: LEFT ANKLE
LOCATION SIMPLE: UPPER BACK
LOCATION SIMPLE: RIGHT CHEEK
LOCATION SIMPLE: RIGHT TEMPLE
LOCATION SIMPLE: LEFT FOREARM
LOCATION SIMPLE: LEFT PRETIBIAL REGION
LOCATION SIMPLE: RIGHT PRETIBIAL REGION
LOCATION SIMPLE: RIGHT FOREARM
LOCATION SIMPLE: RIGHT FOREHEAD
LOCATION SIMPLE: RIGHT UPPER BACK

## 2021-02-15 ASSESSMENT — LOCATION ZONE DERM
LOCATION ZONE: ARM
LOCATION ZONE: FACE
LOCATION ZONE: LEG
LOCATION ZONE: TRUNK

## 2021-02-15 ASSESSMENT — LOCATION DETAILED DESCRIPTION DERM
LOCATION DETAILED: RIGHT MID-UPPER BACK
LOCATION DETAILED: RIGHT MEDIAL TEMPLE
LOCATION DETAILED: RIGHT PROXIMAL PRETIBIAL REGION
LOCATION DETAILED: INFERIOR THORACIC SPINE
LOCATION DETAILED: LEFT DISTAL CALF
LOCATION DETAILED: LEFT DISTAL PRETIBIAL REGION
LOCATION DETAILED: RIGHT PROXIMAL DORSAL FOREARM
LOCATION DETAILED: RIGHT INFERIOR CENTRAL MALAR CHEEK
LOCATION DETAILED: LEFT PROXIMAL PRETIBIAL REGION
LOCATION DETAILED: LEFT POSTERIOR ANKLE
LOCATION DETAILED: LEFT PROXIMAL DORSAL FOREARM
LOCATION DETAILED: RIGHT INFERIOR MEDIAL FOREHEAD
LOCATION DETAILED: LEFT MID-UPPER BACK
LOCATION DETAILED: LEFT SUPERIOR UPPER BACK

## 2021-02-15 NOTE — PROCEDURE: SUNSCREEN RECOMMENDATIONS
General Sunscreen Counseling: I recommended a broad spectrum sunscreen with a SPF of 30 or higher.  I explained that SPF 30 sunscreens block approximately 97 percent of the sun's harmful rays.  Sunscreens should be applied at least 15 minutes prior to expected sun exposure and then every 2 hours after that as long as sun exposure continues. If swimming or exercising sunscreen should be reapplied every 45 minutes to an hour after getting wet or sweating.  One ounce, or the equivalent of a shot glass full of sunscreen, is adequate to protect the skin not covered by a bathing suit. I also recommended a lip balm with a sunscreen as well. Sun protective clothing can be used in lieu of sunscreen but must be worn the entire time you are exposed to the sun's rays.
Detail Level: Zone
Products Recommended: recommended Neutragena Baby Faces stick with titanium dioxide for the face and Vanicream products of the body

## 2021-08-03 ENCOUNTER — IMPORTED ENCOUNTER (OUTPATIENT)
Dept: URBAN - METROPOLITAN AREA CLINIC 31 | Facility: CLINIC | Age: 71
End: 2021-08-03

## 2021-08-03 PROBLEM — H40.1131: Noted: 2021-08-03

## 2021-08-03 PROBLEM — Z96.1: Noted: 2021-08-03

## 2021-08-03 PROBLEM — H04.123: Noted: 2021-08-03

## 2021-08-03 PROCEDURE — 92014 COMPRE OPH EXAM EST PT 1/>: CPT

## 2021-08-03 PROCEDURE — 92133 CPTRZD OPH DX IMG PST SGM ON: CPT

## 2021-08-03 PROCEDURE — 92015 DETERMINE REFRACTIVE STATE: CPT

## 2021-08-03 NOTE — PATIENT DISCUSSION
Dry Eye OU:  Continue current management with Artificial Tears.   Start regenereyes lite BID OU for 90 days D/C xiidra stings too much

## 2021-08-03 NOTE — PATIENT DISCUSSION
1.  Primary open angle glaucoma OU mild - IOP at [facility] OCT stable today. Continue with current treatment plan. Discussed importance of compliance. Will continue to monitor for stability or progression. 2. Dry Eye OU:  Continue current management with Artificial Tears. Start regenereyes lite BID OU for 90 days D/C xiidra stings too much3. Pseudophakia OU - IOLs stable. Monitor for changes in vision. 4. Return for an appointment in 3 months for office call. with Dr. Terrie Graves. Return for an appointment for Tear Osmolarity. with Dr. Terrie Graves.

## 2021-08-03 NOTE — PATIENT DISCUSSION
Return for an appointment in 3 months for office call. with Dr. Terrie Graves. Return for an appointment for Tear Osmolarity. with Dr. Terrie Tinajero

## 2021-08-10 ENCOUNTER — APPOINTMENT (RX ONLY)
Dept: URBAN - METROPOLITAN AREA CLINIC 148 | Facility: CLINIC | Age: 71
Setting detail: DERMATOLOGY
End: 2021-08-10

## 2021-08-10 DIAGNOSIS — L81.5 LEUKODERMA, NOT ELSEWHERE CLASSIFIED: ICD-10-CM

## 2021-08-10 DIAGNOSIS — D18.0 HEMANGIOMA: ICD-10-CM

## 2021-08-10 DIAGNOSIS — Z87.2 PERSONAL HISTORY OF DISEASES OF THE SKIN AND SUBCUTANEOUS TISSUE: ICD-10-CM

## 2021-08-10 DIAGNOSIS — L81.4 OTHER MELANIN HYPERPIGMENTATION: ICD-10-CM

## 2021-08-10 DIAGNOSIS — Z85.828 PERSONAL HISTORY OF OTHER MALIGNANT NEOPLASM OF SKIN: ICD-10-CM

## 2021-08-10 DIAGNOSIS — L82.1 OTHER SEBORRHEIC KERATOSIS: ICD-10-CM

## 2021-08-10 DIAGNOSIS — L57.0 ACTINIC KERATOSIS: ICD-10-CM

## 2021-08-10 DIAGNOSIS — D22 MELANOCYTIC NEVI: ICD-10-CM

## 2021-08-10 PROBLEM — D18.01 HEMANGIOMA OF SKIN AND SUBCUTANEOUS TISSUE: Status: ACTIVE | Noted: 2021-08-10

## 2021-08-10 PROBLEM — D22.39 MELANOCYTIC NEVI OF OTHER PARTS OF FACE: Status: ACTIVE | Noted: 2021-08-10

## 2021-08-10 PROBLEM — D22.5 MELANOCYTIC NEVI OF TRUNK: Status: ACTIVE | Noted: 2021-08-10

## 2021-08-10 PROCEDURE — ? ADDITIONAL NOTES

## 2021-08-10 PROCEDURE — ? COUNSELING

## 2021-08-10 PROCEDURE — 17000 DESTRUCT PREMALG LESION: CPT

## 2021-08-10 PROCEDURE — ? LIQUID NITROGEN

## 2021-08-10 PROCEDURE — ? FULL BODY SKIN EXAM

## 2021-08-10 PROCEDURE — ? SUNSCREEN RECOMMENDATIONS

## 2021-08-10 PROCEDURE — 99213 OFFICE O/P EST LOW 20 MIN: CPT | Mod: 25

## 2021-08-10 PROCEDURE — 17003 DESTRUCT PREMALG LES 2-14: CPT

## 2021-08-10 ASSESSMENT — LOCATION DETAILED DESCRIPTION DERM
LOCATION DETAILED: INFERIOR THORACIC SPINE
LOCATION DETAILED: RIGHT PROXIMAL PRETIBIAL REGION
LOCATION DETAILED: RIGHT MID-UPPER BACK
LOCATION DETAILED: LEFT SUPERIOR UPPER BACK
LOCATION DETAILED: RIGHT PROXIMAL DORSAL FOREARM
LOCATION DETAILED: RIGHT INFERIOR CENTRAL MALAR CHEEK
LOCATION DETAILED: LEFT PROXIMAL DORSAL FOREARM
LOCATION DETAILED: LEFT PROXIMAL PRETIBIAL REGION
LOCATION DETAILED: POSTERIOR MID-PARIETAL SCALP
LOCATION DETAILED: LEFT MID-UPPER BACK
LOCATION DETAILED: RIGHT DISTAL DORSAL FOREARM

## 2021-08-10 ASSESSMENT — LOCATION SIMPLE DESCRIPTION DERM
LOCATION SIMPLE: LEFT PRETIBIAL REGION
LOCATION SIMPLE: RIGHT FOREARM
LOCATION SIMPLE: UPPER BACK
LOCATION SIMPLE: LEFT UPPER BACK
LOCATION SIMPLE: POSTERIOR SCALP
LOCATION SIMPLE: RIGHT PRETIBIAL REGION
LOCATION SIMPLE: RIGHT CHEEK
LOCATION SIMPLE: LEFT FOREARM
LOCATION SIMPLE: RIGHT UPPER BACK

## 2021-08-10 ASSESSMENT — LOCATION ZONE DERM
LOCATION ZONE: SCALP
LOCATION ZONE: FACE
LOCATION ZONE: ARM
LOCATION ZONE: LEG
LOCATION ZONE: TRUNK

## 2021-08-10 NOTE — PROCEDURE: LIQUID NITROGEN
Number Of Freeze-Thaw Cycles: 2 freeze-thaw cycles
Render Post-Care Instructions In Note?: no
Show Applicator Variable?: Yes
Consent: The patient's consent was obtained including but not limited to risks of crusting, scabbing, blistering, scarring, darker or lighter pigmentary change, recurrence, incomplete removal and infection.
Post-Care Instructions: I reviewed with the patient in detail post-care instructions. Patient is to wear sunprotection, and avoid picking at any of the treated lesions. Pt may apply Vaseline to crusted or scabbing areas.
Duration Of Freeze Thaw-Cycle (Seconds): 3
Detail Level: Zone

## 2021-11-05 ENCOUNTER — IMPORTED ENCOUNTER (OUTPATIENT)
Dept: URBAN - METROPOLITAN AREA CLINIC 31 | Facility: CLINIC | Age: 71
End: 2021-11-05

## 2021-11-05 PROBLEM — H04.123: Noted: 2021-11-05

## 2021-11-05 PROBLEM — H40.1131: Noted: 2021-11-05

## 2021-11-05 PROBLEM — Z96.1: Noted: 2021-11-05

## 2021-11-05 PROCEDURE — 99213 OFFICE O/P EST LOW 20 MIN: CPT

## 2021-11-05 PROCEDURE — 83861 MICROFLUID ANALY TEARS: CPT

## 2021-11-05 NOTE — PATIENT DISCUSSION
1.  Dry Eye OU: Much improved on regenereyes vs Annella Dom she used before. On 2nd 3 month course. Continue current management with Artificial Tears. Continue Regenereyes lite BID OU till gone. Then trial DC may need to renew if worsensH/O D/C xiidra stings too much2. Primary open angle glaucoma OU mild - Continue with current treatment plan. Discussed importance of compliance. Will continue to monitor for stability or progression. 3. Doing well with MFIOL. Monitor for changes in vision. 4. Return for an appointment in 6 weeks for office call. Tear Osmolarity. with Dr. Raulito Guerra.

## 2021-12-17 ENCOUNTER — IMPORTED ENCOUNTER (OUTPATIENT)
Dept: URBAN - METROPOLITAN AREA CLINIC 31 | Facility: CLINIC | Age: 71
End: 2021-12-17

## 2021-12-17 PROBLEM — H40.1131: Noted: 2021-12-17

## 2021-12-17 PROBLEM — H16.223: Noted: 2021-12-17

## 2021-12-17 PROBLEM — Z96.1: Noted: 2021-12-17

## 2021-12-17 PROCEDURE — 83861 MICROFLUID ANALY TEARS: CPT

## 2021-12-17 PROCEDURE — 99213 OFFICE O/P EST LOW 20 MIN: CPT

## 2021-12-17 NOTE — PATIENT DISCUSSION
1. Keratoconjunctivitis Sicca OU:  Continue current management. much improved on regenereyes cpm2. Doing well with MFIOL. Monitor for changes in vision. 3. Primary open angle glaucoma OU mild - s/p iStent Discussed importance of compliance. Will continue to monitor for stability or progression. 4. Return for an appointment in 6 months for comprehensive exam. OCT Nerve. with Dr. Caren Lazcano.

## 2022-04-02 ASSESSMENT — VISUAL ACUITY
OS_PH: 20/25
OU_SC: 20/25-2
OS_CC: 20/30+2
OD_PH: SC 20/30
OS_CC: 20/30
OS_SC: 20/200
OS_PH: SC 20/25 -2
OD_CC: 20/40
OS_CC: 20/25+2
OS_CC: 20/25
OD_CC: 20/25-2
OU_CC: 20/25
OS_PH: SC 20/25 +2
OS_PH: SC 20/25 -1
OS_CC: 20/80-2
OD_CC: 20/40-1
OS_CC: 20/40-2
OS_PH: SC 20/30 -1
OD_CC: 20/30-2
OU_SC: 20/200
OD_PH: SC 20/30
OS_CC: 20/30+2
OU_SC: 20/40
OU_CC: 20/30
OS_CC: 20/400
OD_CC: 20/20-1
OS_CC: 20/60-1
OS_CC: 20/30-3
OS_PH: SC 20/40 +1
OD_PH: SC 20/40 -2
OD_CC: 20/50
OD_CC: 20/60
OD_SC: J1+
OS_PH: SC 20/30
OD_CC: 20/40
OS_PH: SC 20/30
OD_GLARE: 20/70
OD_CC: 20/30+2
OD_PH: SC 20/30 -1
OU_CC: 20/2016''
OS_CC: 20/40
OS_SC: 20/60+2
OS_CC: 20/25-2
OU_CC: 20/30
OS_PH: SC 20/25 -1
OD_SC: 20/40
OS_CC: 20/40
OS_CC: 20/50+2
OD_CC: 20/25
OD_CC: 20/40-2
OD_CC: 20/25-2
OS_CC: 20/40-2
OD_CC: 20/40+3
OD_CC: 20/60-1
OS_CC: 20/60-1
OU_CC: 20/40
OD_CC: 20/40+2
OS_GLARE: 20/70MED
OS_CC: 20/30-1
OD_CC: 20/30-2
OS_SC: J1
OD_CC: 20/40-1
OS_CC: 20/40
OS_CC: 20/40+2
OS_PH: SC 20/30 -2
OU_SC: J1+
OS_CC: 20/40-1
OS_SC: 20/50-1
OD_SC: 20/30+1
OD_SC: 20/40
OD_CC: 20/40+1
OD_CC: 20/30+2
OS_SC: J3
OS_CC: 20/40-2
OD_SC: J2
OD_CC: 20/40
OD_CC: 20/30
OD_CC: 20/60-1
OS_CC: 20/50
OS_PH: SC 20/40
OD_PH: SC 20/40 -2
OD_SC: 20/200
OS_CC: 20/70+2
OD_PH: SC 20/30 +1
OS_CC: 20/40+1
OD_CC: 20/40+2
OS_CC: 20/40-1
OD_PH: SC 20/25 -2
OD_CC: 20/30-2
OD_CC: 20/40
OD_CC: 20/100
OD_PH: SC 20/40
OS_CC: 20/20-2
OD_CC: 20/30-2
OS_CC: 20/50-2
OS_SC: J1
OD_CC: 20/25
OU_SC: 20/25-1
OS_SC: 20/50
OS_CC: 20/50+2
OS_GLARE: 20/100
OD_CC: 20/40
OD_SC: J1
OS_PH: SC 20/30 -1
OD_PH: SC 20/30 -1
OS_PH: SC 20/30 -2
OS_GLARE: 20/40
OS_CC: 20/40+2
OS_CC: 20/40
OD_SC: 20/25-2
OD_PH: SC 20/40 -1
OS_SC: 20/30
OD_CC: 20/30-2
OD_CC: 20/40
OS_PH: SC 20/30
OS_CC: 20/25-3
OD_SC: 20/50+1
OD_CC: 20/20
OD_CC: 20/40-1
OS_CC: 20/50
OD_GLARE: 20/50MED
OD_CC: 20/40-2
OS_CC: 20/30+2
OS_PH: SC 20/30
OS_PH: SC 20/30
OU_CC: 20/25-1
OD_GLARE: 20/60
OS_CC: 20/30-1
OS_CC: 20/25

## 2022-04-02 ASSESSMENT — TONOMETRY
OS_IOP_MMHG: 13
OS_IOP_MMHG: 14
OS_IOP_MMHG: 14
OS_IOP_MMHG: 17
OS_IOP_MMHG: 14
OS_IOP_MMHG: 17
OS_IOP_MMHG: 15
OD_IOP_MMHG: 14
OS_IOP_MMHG: 15
OD_IOP_MMHG: 15
OD_IOP_MMHG: 14
OD_IOP_MMHG: 15
OS_IOP_MMHG: 13
OD_IOP_MMHG: 15
OD_IOP_MMHG: 19
OD_IOP_MMHG: 13
OD_IOP_MMHG: 13
OS_IOP_MMHG: 14
OS_IOP_MMHG: 14
OS_IOP_MMHG: 15
OD_IOP_MMHG: 15
OD_IOP_MMHG: 15
OD_IOP_MMHG: 14
OS_IOP_MMHG: 14
OS_IOP_MMHG: 16
OS_IOP_MMHG: 14
OS_IOP_MMHG: 12
OS_IOP_MMHG: 15
OS_IOP_MMHG: 13
OD_IOP_MMHG: 17
OS_IOP_MMHG: 14
OD_IOP_MMHG: 15
OS_IOP_MMHG: 15
OD_IOP_MMHG: 13
OS_IOP_MMHG: 12
OD_IOP_MMHG: 13
OD_IOP_MMHG: 17
OD_IOP_MMHG: 18
OS_IOP_MMHG: 14
OD_IOP_MMHG: 13
OD_IOP_MMHG: 14
OD_IOP_MMHG: 14
OD_IOP_MMHG: 13

## 2022-07-01 ENCOUNTER — COMPREHENSIVE EXAM (OUTPATIENT)
Dept: URBAN - METROPOLITAN AREA CLINIC 29 | Facility: CLINIC | Age: 72
End: 2022-07-01

## 2022-07-01 DIAGNOSIS — H40.1131: ICD-10-CM

## 2022-07-01 DIAGNOSIS — Z96.1: ICD-10-CM

## 2022-07-01 PROCEDURE — 92133 CPTRZD OPH DX IMG PST SGM ON: CPT

## 2022-07-01 PROCEDURE — 92014 COMPRE OPH EXAM EST PT 1/>: CPT

## 2022-07-01 ASSESSMENT — VISUAL ACUITY
OS_PH: 20/25
OD_SC: 20/40
OS_SC: 20/40

## 2022-07-01 ASSESSMENT — TONOMETRY
OD_IOP_MMHG: 12
OS_IOP_MMHG: 12

## 2022-07-01 NOTE — PATIENT DISCUSSION
Patient achieved a 15% reduction in IOP from pretreatment levels or a plan is in place to achieve this goal.  OCT stable.

## 2022-07-01 NOTE — PATIENT DISCUSSION
continue tears, try tyrvaya (sample given)advised to call back and follow up to let us know if tyrvaya is working. If so, okay to send rx.  If not working, okay to resend regenereyes 2x/day as she has had a good response in the past.

## 2022-07-18 NOTE — PATIENT DISCUSSION
Advised regular use of Amsler grid. Erivedge Counseling- I discussed with the patient the risks of Erivedge including but not limited to nausea, vomiting, diarrhea, constipation, weight loss, changes in the sense of taste, decreased appetite, muscle spasms, and hair loss.  The patient verbalized understanding of the proper use and possible adverse effects of Erivedge.  All of the patient's questions and concerns were addressed.

## 2022-08-03 ENCOUNTER — APPOINTMENT (RX ONLY)
Dept: URBAN - METROPOLITAN AREA CLINIC 121 | Facility: CLINIC | Age: 72
Setting detail: DERMATOLOGY
End: 2022-08-03

## 2022-08-03 DIAGNOSIS — D22 MELANOCYTIC NEVI: ICD-10-CM

## 2022-08-03 DIAGNOSIS — Z85.828 PERSONAL HISTORY OF OTHER MALIGNANT NEOPLASM OF SKIN: ICD-10-CM

## 2022-08-03 DIAGNOSIS — D18.0 HEMANGIOMA: ICD-10-CM

## 2022-08-03 DIAGNOSIS — L82.0 INFLAMED SEBORRHEIC KERATOSIS: ICD-10-CM

## 2022-08-03 DIAGNOSIS — L57.0 ACTINIC KERATOSIS: ICD-10-CM

## 2022-08-03 DIAGNOSIS — L81.4 OTHER MELANIN HYPERPIGMENTATION: ICD-10-CM

## 2022-08-03 DIAGNOSIS — L82.1 OTHER SEBORRHEIC KERATOSIS: ICD-10-CM

## 2022-08-03 PROBLEM — D18.01 HEMANGIOMA OF SKIN AND SUBCUTANEOUS TISSUE: Status: ACTIVE | Noted: 2022-08-03

## 2022-08-03 PROBLEM — D22.5 MELANOCYTIC NEVI OF TRUNK: Status: ACTIVE | Noted: 2022-08-03

## 2022-08-03 PROCEDURE — ? COUNSELING

## 2022-08-03 PROCEDURE — 17003 DESTRUCT PREMALG LES 2-14: CPT | Mod: 59

## 2022-08-03 PROCEDURE — ? LIQUID NITROGEN

## 2022-08-03 PROCEDURE — 17000 DESTRUCT PREMALG LESION: CPT | Mod: 59

## 2022-08-03 PROCEDURE — 99203 OFFICE O/P NEW LOW 30 MIN: CPT | Mod: 25

## 2022-08-03 PROCEDURE — 17110 DESTRUCTION B9 LES UP TO 14: CPT

## 2022-08-03 ASSESSMENT — LOCATION DETAILED DESCRIPTION DERM
LOCATION DETAILED: LEFT RIB CAGE
LOCATION DETAILED: LEFT SUPERIOR UPPER BACK
LOCATION DETAILED: RIGHT INFERIOR UPPER BACK
LOCATION DETAILED: RIGHT LATERAL ABDOMEN
LOCATION DETAILED: LEFT MEDIAL SUPERIOR CHEST
LOCATION DETAILED: NASAL DORSUM
LOCATION DETAILED: POSTERIOR MID-PARIETAL SCALP
LOCATION DETAILED: RIGHT VENTRAL LATERAL DISTAL FOREARM
LOCATION DETAILED: LEFT PROXIMAL CALF

## 2022-08-03 ASSESSMENT — LOCATION SIMPLE DESCRIPTION DERM
LOCATION SIMPLE: NOSE
LOCATION SIMPLE: ABDOMEN
LOCATION SIMPLE: RIGHT UPPER BACK
LOCATION SIMPLE: LEFT UPPER BACK
LOCATION SIMPLE: POSTERIOR SCALP
LOCATION SIMPLE: LEFT CALF
LOCATION SIMPLE: RIGHT FOREARM
LOCATION SIMPLE: CHEST

## 2022-08-03 ASSESSMENT — LOCATION ZONE DERM
LOCATION ZONE: LEG
LOCATION ZONE: TRUNK
LOCATION ZONE: ARM
LOCATION ZONE: SCALP
LOCATION ZONE: NOSE

## 2022-08-03 NOTE — PROCEDURE: COUNSELING
Sunscreen Recommendations: Spf 30 or 50
Detail Level: Simple
Detail Level: Zone
Sunscreen Recommendations: SPF with zinc 30- 48
Detail Level: Generalized
Sunscreen Recommendations: Spf 30 to 50

## 2022-08-03 NOTE — PROCEDURE: LIQUID NITROGEN
Detail Level: Simple
Post-Care Instructions: I reviewed with the patient in detail post-care instructions. Patient is to wear sunprotection, and avoid picking at any of the treated lesions. Pt may apply Vaseline to crusted or scabbing areas.
Duration Of Freeze Thaw-Cycle (Seconds): 2
Consent: The patient's consent was obtained including but not limited to risks of crusting, scabbing, blistering, scarring, darker or lighter pigmentary change, recurrence, incomplete removal and infection.
Number Of Freeze-Thaw Cycles: 3 freeze-thaw cycles
Show Applicator Variable?: Yes
Render Note In Bullet Format When Appropriate: No
Application Tool (Optional): Liquid Nitrogen Sprayer
Medical Necessity Information: It is in your best interest to select a reason for this procedure from the list below. All of these items fulfill various CMS LCD requirements except the new and changing color options.
Medical Necessity Clause: This procedure was medically necessary because the lesions that were treated were:
Number Of Freeze-Thaw Cycles: 1 freeze-thaw cycle
Spray Paint Text: The liquid nitrogen was applied to the skin utilizing a spray paint frosting technique.
Duration Of Freeze Thaw-Cycle (Seconds): 5-10

## 2022-08-03 NOTE — PATIENT DISCUSSION
Mariano is here for his annual check-up:    Couple months   Ankles   Breathing normal  Good exercise tolerance    Memory: good  Balance; good  Falls: none  Mood: good    Mariano Ponce is a 66 year old male being evaluated via office visit.    HPI: see above  ROS: see above    I have reviewed the patient's medications and allergies, past medical, surgical, social and family history, updating these as appropriate.  See Histories section of the EMR (electronic medical record) for a display of this information.    Physical Exam:  Visit Vitals  BP (!) 152/89 (BP Location: RUE - Right upper extremity, Patient Position: Sitting, Cuff Size: Regular)   Pulse 69   Temp 97.1 °F (36.2 °C) (Temporal)   Resp 16   Ht 5' 6\" (1.676 m)   Wt 75.6 kg (166 lb 11.2 oz)   SpO2 99%   BMI 26.91 kg/m²     General Appearance: Well groomed and in no apparent distress  HEENT: Eyes aligned, normal appearing conjunctivae, sclera, and pupils  Respiratory: In no respiratory distress and Lungs clear to percussion and auscultation  Cardiac: RRR, no MRG, Carotids: no bruits and trace edema  left>right  Skin: Warm and dry, no rashes  MSK: Joints normal to inspection, no redness or swelling   Varicose veins noted      No visits with results within 4 Week(s) from this visit.   Latest known visit with results is:   Lab Services on 06/24/2021   Component Date Value Ref Range Status   • iFOB (aka FIT) - Fecal Occult Blood 06/24/2021 Negative  Negative Final     Annual physical exam  (primary encounter diagnosis)  Comment: good health overall  Plan: RTC one year and PRN    Generalized edema  Comment: suspect this is venous insufficiency  Plan: THYROID STIMULATING HORMONE REFLEX, CBC WITH         DIFFERENTIAL, US LOWER EXTREMITY VENOUS DUPLEX         BILATERAL            Colon cancer screening  Plan: OCCULT BLOOD - FIT          Greater than 50% of this visit, lasting 40 minutes, was spent counseling this patient on the above diagnosis(es), testing and treatment  Return for an appointment in 6 weeks for office call. MRx. with Dr. Ana Grayson. options (excluding the MWV).  Visit time includes chart prep and review, as well as documentation.      Return in about 1 year (around 8/3/2023) for your annual check-up and wellness visit with Dr Ashley.    Mariano Yo MD [facility]    Patient Care Team:  Mariano Yo MD as PCP - General (Internal Medicine)                MEDICARE WELLNESS VISIT NOTE    HISTORY OF PRESENT ILLNESS:   Mariano Ponce presents for his Subsequent Annual Medicare Wellness Visit.   He has complaints or concerns which include see above.      Patient Care Team:  Mariano Yo MD as PCP - General (Internal Medicine)        Patient Active Problem List   Diagnosis   • Pure hypercholesterolemia         History reviewed. No pertinent past medical history.      Past Surgical History:   Procedure Laterality Date   • Colonoscopy  2005    normal   • Finger surgery Left 2003    little finger         Social History     Tobacco Use   • Smoking status: Former Smoker   • Smokeless tobacco: Never Used   • Tobacco comment: quit pipe tobacco 15 years ago   Vaping Use   • Vaping Use: never used   Substance Use Topics   • Alcohol use: Yes     Alcohol/week: 14.0 standard drinks     Types: 14 Glasses of wine per week     Comment: 2 glasses of wine with dinner nightly   • Drug use: Never     Drug use:    Drug Use:    Never           Family History   Problem Relation Age of Onset   • Other Mother    • Parkinsonism Mother    • Stroke Father    • Cancer, Pancreatic Brother         active   • Mesothelioma Brother         cured   • Hearing Loss Maternal Uncle        No current outpatient medications on file.     No current facility-administered medications for this visit.        The following items on the Medicare Health Risk Assessment were found to be positive  14.) During the past 4 weeks, was someone available to help if you needed and wanted help?: Yes, a little         Vision and Hearing screens: Both screenings were performed and reviewed    Advance Directive:    The patient has the following documents:  No Advance Directives on file. Patient offered documents.    Cognitive/Functional Status: no evidence of cognitive dysfunction by direct observation    Opioid Review: Mariano is not taking opioid medications.    Recent PHQ 2/9 Score:    PHQ 2:  Date Adult PHQ 2 Score Adult PHQ 2 Interpretation   7/27/2022 0 No further screening needed       PHQ 9:       DEPRESSION ASSESSMENT/PLAN:  Depression screening is negative no further plan needed.     Body mass index is 26.91 kg/m².    BMI ASSESSMENT/PLAN:  Patient BMI is within normal range.       Needed follow up:  See above and below    See orders.   See Patient Instructions section.   Return in about 1 year (around 8/3/2023) for your annual check-up and wellness visit with Dr Ashley.

## 2022-08-03 NOTE — PROCEDURE: MIPS QUALITY
Quality 47: Advance Care Plan: Advance Care Planning discussed and documented; advance care plan or surrogate decision maker documented in the medical record.
Quality 402: Tobacco Use And Help With Quitting Among Adolescents: Patient screened for tobacco and never smoked
Quality 130: Documentation Of Current Medications In The Medical Record: Current Medications Documented
Name And Contact Information For Health Care Proxy: Kwesi Madera
Quality 111:Pneumonia Vaccination Status For Older Adults: Pneumococcal vaccine administered on or after patientâs 60th birthday and before the end of the measurement period
Detail Level: Zone

## 2022-12-14 ENCOUNTER — FOLLOW UP (OUTPATIENT)
Dept: URBAN - METROPOLITAN AREA CLINIC 29 | Facility: CLINIC | Age: 72
End: 2022-12-14

## 2022-12-14 PROCEDURE — 92083 EXTENDED VISUAL FIELD XM: CPT

## 2022-12-14 PROCEDURE — 92012 INTRM OPH EXAM EST PATIENT: CPT

## 2022-12-14 ASSESSMENT — TONOMETRY
OS_IOP_MMHG: 15
OD_IOP_MMHG: 15

## 2022-12-14 ASSESSMENT — VISUAL ACUITY
OS_SC: 20/25
OS_SC: 20/70
OD_SC: 20/25
OD_SC: 20/30

## 2022-12-14 NOTE — PATIENT DISCUSSION
Patient achieved a 15% reduction in IOP from pretreatment levels or a plan is in place to achieve this goal. VF stable.

## 2023-02-15 ENCOUNTER — APPOINTMENT (RX ONLY)
Dept: URBAN - METROPOLITAN AREA CLINIC 121 | Facility: CLINIC | Age: 73
Setting detail: DERMATOLOGY
End: 2023-02-15

## 2023-02-15 DIAGNOSIS — D22 MELANOCYTIC NEVI: ICD-10-CM

## 2023-02-15 DIAGNOSIS — L57.0 ACTINIC KERATOSIS: ICD-10-CM

## 2023-02-15 DIAGNOSIS — L82.0 INFLAMED SEBORRHEIC KERATOSIS: ICD-10-CM

## 2023-02-15 DIAGNOSIS — R20.2 PARESTHESIA OF SKIN: ICD-10-CM

## 2023-02-15 DIAGNOSIS — L81.4 OTHER MELANIN HYPERPIGMENTATION: ICD-10-CM

## 2023-02-15 DIAGNOSIS — D18.0 HEMANGIOMA: ICD-10-CM

## 2023-02-15 DIAGNOSIS — Z85.828 PERSONAL HISTORY OF OTHER MALIGNANT NEOPLASM OF SKIN: ICD-10-CM

## 2023-02-15 DIAGNOSIS — D49.2 NEOPLASM OF UNSPECIFIED BEHAVIOR OF BONE, SOFT TISSUE, AND SKIN: ICD-10-CM

## 2023-02-15 DIAGNOSIS — L82.1 OTHER SEBORRHEIC KERATOSIS: ICD-10-CM

## 2023-02-15 PROBLEM — D18.01 HEMANGIOMA OF SKIN AND SUBCUTANEOUS TISSUE: Status: ACTIVE | Noted: 2023-02-15

## 2023-02-15 PROBLEM — D22.5 MELANOCYTIC NEVI OF TRUNK: Status: ACTIVE | Noted: 2023-02-15

## 2023-02-15 PROCEDURE — ? LIQUID NITROGEN

## 2023-02-15 PROCEDURE — ? PRESCRIPTION

## 2023-02-15 PROCEDURE — 17000 DESTRUCT PREMALG LESION: CPT | Mod: 59

## 2023-02-15 PROCEDURE — ? COUNSELING

## 2023-02-15 PROCEDURE — 11102 TANGNTL BX SKIN SINGLE LES: CPT | Mod: 59

## 2023-02-15 PROCEDURE — 99213 OFFICE O/P EST LOW 20 MIN: CPT | Mod: 25

## 2023-02-15 PROCEDURE — 17110 DESTRUCTION B9 LES UP TO 14: CPT

## 2023-02-15 PROCEDURE — ? BIOPSY BY SHAVE METHOD

## 2023-02-15 RX ORDER — LIDOCAINE 5 G/100G
CREAM RECTAL; TOPICAL
Qty: 30 | Refills: 2 | Status: ERX | COMMUNITY
Start: 2023-02-15

## 2023-02-15 RX ADMIN — LIDOCAINE: 5 CREAM RECTAL; TOPICAL at 00:00

## 2023-02-15 ASSESSMENT — LOCATION SIMPLE DESCRIPTION DERM
LOCATION SIMPLE: SCALP
LOCATION SIMPLE: LEFT CHEEK
LOCATION SIMPLE: RIGHT SCALP
LOCATION SIMPLE: RIGHT BUTTOCK
LOCATION SIMPLE: RIGHT FOREARM
LOCATION SIMPLE: ABDOMEN
LOCATION SIMPLE: LEFT UPPER BACK
LOCATION SIMPLE: LEFT BUTTOCK
LOCATION SIMPLE: RIGHT UPPER BACK
LOCATION SIMPLE: RIGHT CHEEK
LOCATION SIMPLE: RIGHT THIGH

## 2023-02-15 ASSESSMENT — LOCATION ZONE DERM
LOCATION ZONE: SCALP
LOCATION ZONE: FACE
LOCATION ZONE: ARM
LOCATION ZONE: TRUNK
LOCATION ZONE: LEG

## 2023-02-15 ASSESSMENT — LOCATION DETAILED DESCRIPTION DERM
LOCATION DETAILED: RIGHT MEDIAL UPPER BACK
LOCATION DETAILED: RIGHT PROXIMAL DORSAL FOREARM
LOCATION DETAILED: RIGHT SUPERIOR PARIETAL SCALP
LOCATION DETAILED: RIGHT BUTTOCK
LOCATION DETAILED: RIGHT INFERIOR CENTRAL MALAR CHEEK
LOCATION DETAILED: RIGHT ANTERIOR PROXIMAL THIGH
LOCATION DETAILED: LEFT SUPERIOR PREAURICULAR CHEEK
LOCATION DETAILED: RIGHT CENTRAL FRONTAL SCALP
LOCATION DETAILED: LEFT SUPERIOR MEDIAL UPPER BACK
LOCATION DETAILED: LEFT BUTTOCK
LOCATION DETAILED: EPIGASTRIC SKIN

## 2023-02-15 NOTE — PROCEDURE: BIOPSY BY SHAVE METHOD
Detail Level: Detailed
Depth Of Biopsy: dermis
Was A Bandage Applied: Yes
Size Of Lesion In Cm: 0.8
X Size Of Lesion In Cm: 0
Biopsy Type: H and E
Biopsy Method: Dermablade
Anesthesia Type: 1% lidocaine without epinephrine
Anesthesia Volume In Cc (Will Not Render If 0): 1
Hemostasis: Electrocautery
Wound Care: Vaseline
Dressing: pressure dressing with telfa
Destruction After The Procedure: No
Type Of Destruction Used: Curettage
Curettage Text: The wound bed was treated with curettage after the biopsy was performed.
Cryotherapy Text: The wound bed was treated with cryotherapy after the biopsy was performed.
Electrodesiccation Text: The wound bed was treated with electrodesiccation after the biopsy was performed.
Electrodesiccation And Curettage Text: The wound bed was treated with electrodesiccation and curettage after the biopsy was performed.
Silver Nitrate Text: The wound bed was treated with silver nitrate after the biopsy was performed.
Lab: [facility]
Lab Facility: [id number]
Consent: The provider's intent is to obtain a tissue sample solely for diagnostic purposes. Written consent to obtain tissue sample was obtained and risks were reviewed including but not limited to scarring, infection, bleeding, scabbing, incomplete removal, nerve damage and allergy to anesthesia.
Post-Care Instructions: I reviewed with the patient in detail post-care instructions. Patient is to keep the biopsy site dry overnight, and then apply bacitracin twice daily until healed. Patient may apply hydrogen peroxide soaks to remove any crusting.
Notification Instructions: Patient will be notified of biopsy results. However, patient instructed to call the office if not contacted within 2 weeks.
Billing Type: [employer]
Information: Selecting Yes will display possible errors in your note based on the variables you have selected. This validation is only offered as a suggestion for you. PLEASE NOTE THAT THE VALIDATION TEXT WILL BE REMOVED WHEN YOU FINALIZE YOUR NOTE. IF YOU WANT TO FAX A PRELIMINARY NOTE YOU WILL NEED TO TOGGLE THIS TO 'NO' IF YOU DO NOT WANT IT IN YOUR FAXED NOTE.

## 2023-02-15 NOTE — PROCEDURE: COUNSELING
Sunscreen Recommendations: Spf 30 or 50
Detail Level: Simple
Sunscreen Recommendations: SPF with zinc 30- [address]
Detail Level: Generalized
Detail Level: Zone
Sunscreen Recommendations: Spf 30 to 50

## 2023-02-15 NOTE — PROCEDURE: LIQUID NITROGEN
Detail Level: Simple
Consent: The patient's consent was obtained including but not limited to risks of crusting, scabbing, blistering, scarring, darker or lighter pigmentary change, recurrence, incomplete removal and infection.
Post-Care Instructions: I reviewed with the patient in detail post-care instructions. Patient is to wear sunprotection, and avoid picking at any of the treated lesions. Pt may apply Vaseline to crusted or scabbing areas.
Include Z78.9 (Other Specified Conditions Influencing Health Status) As An Associated Diagnosis?: Yes
Application Tool (Optional): [facility]
Render Post-Care Instructions In Note?: no
Medical Necessity Information: It is in your best interest to select a reason for this procedure from the list below. All of these items fulfill various CMS LCD requirements except the new and changing color options.
Medical Necessity Clause: This procedure was medically necessary because the lesions that were treated were:
Spray Paint Text: The liquid nitrogen was applied to the skin utilizing a spray paint frosting technique.
Number Of Freeze-Thaw Cycles: 3 freeze-thaw cycles
Application Tool (Optional): Liquid Nitrogen Sprayer
Duration Of Freeze Thaw-Cycle (Seconds): 2

## 2023-02-23 ENCOUNTER — RX ONLY (OUTPATIENT)
Age: 73
Setting detail: RX ONLY
End: 2023-02-23

## 2023-02-23 RX ORDER — LIDOCAINE 5 G/100G
CREAM RECTAL; TOPICAL
Qty: 30 | Refills: 2 | Status: ERX

## 2023-07-07 ENCOUNTER — COMPREHENSIVE EXAM (OUTPATIENT)
Dept: URBAN - METROPOLITAN AREA CLINIC 29 | Facility: CLINIC | Age: 73
End: 2023-07-07

## 2023-07-07 DIAGNOSIS — H40.1131: ICD-10-CM

## 2023-07-07 DIAGNOSIS — Z96.1: ICD-10-CM

## 2023-07-07 DIAGNOSIS — H16.223: ICD-10-CM

## 2023-07-07 PROCEDURE — 92015 DETERMINE REFRACTIVE STATE: CPT

## 2023-07-07 PROCEDURE — 92014 COMPRE OPH EXAM EST PT 1/>: CPT

## 2023-07-07 PROCEDURE — 92133 CPTRZD OPH DX IMG PST SGM ON: CPT

## 2023-07-07 ASSESSMENT — TONOMETRY
OS_IOP_MMHG: 16
OD_IOP_MMHG: 15

## 2023-07-07 ASSESSMENT — VISUAL ACUITY
OD_SC: 20/50+2
OS_SC: 20/40+3
OS_SC: 20/40-2
OD_SC: 20/50+2
OD_PH: 20/40-1

## 2024-01-23 ENCOUNTER — RX ONLY (OUTPATIENT)
Age: 74
Setting detail: RX ONLY
End: 2024-01-23

## 2024-01-23 RX ORDER — LIDOCAINE 5 G/100G
1 CREAM RECTAL; TOPICAL BID
Qty: 30 | Refills: 0 | Status: ERX

## 2024-06-28 ENCOUNTER — APPOINTMENT (RX ONLY)
Dept: URBAN - METROPOLITAN AREA CLINIC 121 | Facility: CLINIC | Age: 74
Setting detail: DERMATOLOGY
End: 2024-06-28

## 2024-06-28 DIAGNOSIS — D18.0 HEMANGIOMA: ICD-10-CM

## 2024-06-28 DIAGNOSIS — L82.1 OTHER SEBORRHEIC KERATOSIS: ICD-10-CM

## 2024-06-28 DIAGNOSIS — Z71.89 OTHER SPECIFIED COUNSELING: ICD-10-CM

## 2024-06-28 DIAGNOSIS — Z85.828 PERSONAL HISTORY OF OTHER MALIGNANT NEOPLASM OF SKIN: ICD-10-CM

## 2024-06-28 DIAGNOSIS — D49.2 NEOPLASM OF UNSPECIFIED BEHAVIOR OF BONE, SOFT TISSUE, AND SKIN: ICD-10-CM

## 2024-06-28 DIAGNOSIS — L81.4 OTHER MELANIN HYPERPIGMENTATION: ICD-10-CM

## 2024-06-28 PROBLEM — D18.01 HEMANGIOMA OF SKIN AND SUBCUTANEOUS TISSUE: Status: ACTIVE | Noted: 2024-06-28

## 2024-06-28 PROCEDURE — ? ADDITIONAL NOTES

## 2024-06-28 PROCEDURE — 99213 OFFICE O/P EST LOW 20 MIN: CPT | Mod: 25

## 2024-06-28 PROCEDURE — 11102 TANGNTL BX SKIN SINGLE LES: CPT

## 2024-06-28 PROCEDURE — ? BIOPSY BY SHAVE METHOD

## 2024-06-28 PROCEDURE — ? PHOTO-DOCUMENTATION

## 2024-06-28 PROCEDURE — ? COUNSELING

## 2024-06-28 ASSESSMENT — LOCATION SIMPLE DESCRIPTION DERM
LOCATION SIMPLE: CHEST
LOCATION SIMPLE: RIGHT FOREARM
LOCATION SIMPLE: ABDOMEN
LOCATION SIMPLE: RIGHT THIGH
LOCATION SIMPLE: RIGHT BREAST
LOCATION SIMPLE: UPPER BACK
LOCATION SIMPLE: LEFT FOREARM

## 2024-06-28 ASSESSMENT — LOCATION DETAILED DESCRIPTION DERM
LOCATION DETAILED: INFERIOR THORACIC SPINE
LOCATION DETAILED: RIGHT DISTAL DORSAL FOREARM
LOCATION DETAILED: SUBXIPHOID
LOCATION DETAILED: RIGHT INFRAMAMMARY CREASE (INNER QUADRANT)
LOCATION DETAILED: LOWER STERNUM
LOCATION DETAILED: SUPERIOR THORACIC SPINE
LOCATION DETAILED: LEFT DISTAL DORSAL FOREARM
LOCATION DETAILED: MIDDLE STERNUM
LOCATION DETAILED: RIGHT ANTERIOR MEDIAL DISTAL THIGH

## 2024-06-28 ASSESSMENT — LOCATION ZONE DERM
LOCATION ZONE: LEG
LOCATION ZONE: ARM
LOCATION ZONE: TRUNK

## 2024-06-28 NOTE — PROCEDURE: MIPS QUALITY
Quality 226: Preventive Care And Screening: Tobacco Use: Screening And Cessation Intervention: Patient screened for tobacco use and is an ex/non-smoker
Detail Level: Detailed
Name And Contact Information For Health Care Proxy: Carlos Enrique Garza Nash [phone]
Quality 47: Advance Care Plan: Advance Care Planning discussed and documented; advance care plan or surrogate decision maker documented in the medical record.

## 2024-06-28 NOTE — PROCEDURE: ADDITIONAL NOTES
Render Risk Assessment In Note?: no
Detail Level: Zone
Additional Notes: Wound aftercare instructions provided

## 2024-06-28 NOTE — PROCEDURE: BIOPSY BY SHAVE METHOD
Detail Level: Detailed
Depth Of Biopsy: dermis
Was A Bandage Applied: Yes
Size Of Lesion In Cm: 0.7
X Size Of Lesion In Cm: 0
Biopsy Type: H and E
Biopsy Method: Personna blade
Anesthesia Type: 1% lidocaine without epinephrine
Anesthesia Volume In Cc: 0.5
Hemostasis: Aluminum Chloride and Silver Nitrate
Wound Care: Petrolatum
Dressing: bandage
Destruction After The Procedure: No
Type Of Destruction Used: Curettage
Curettage Text: The wound bed was treated with curettage after the biopsy was performed.
Cryotherapy Text: The wound bed was treated with cryotherapy after the biopsy was performed.
Electrodesiccation Text: The wound bed was treated with electrodesiccation after the biopsy was performed.
Electrodesiccation And Curettage Text: The wound bed was treated with electrodesiccation and curettage after the biopsy was performed.
Silver Nitrate Text: The wound bed was treated with silver nitrate after the biopsy was performed.
Lab: [id number]
Lab Facility: [id number]
Consent: The provider's intent is to obtain a tissue sample solely for diagnostic purposes. Written consent to obtain tissue sample was obtained and risks were reviewed including but not limited to scarring, infection, bleeding, scabbing, incomplete removal, nerve damage and allergy to anesthesia.
Post-Care Instructions: I reviewed with the patient in detail post-care instructions. Patient is to keep the biopsy site dry overnight, and then apply bacitracin twice daily until healed. Patient may apply hydrogen peroxide soaks to remove any crusting.
Notification Instructions: Patient will be notified of biopsy results. However, patient instructed to call the office if not contacted within 2 weeks.
Billing Type: Third-Party Bill
Information: Selecting Yes will display possible errors in your note based on the variables you have selected. This validation is only offered as a suggestion for you. PLEASE NOTE THAT THE VALIDATION TEXT WILL BE REMOVED WHEN YOU FINALIZE YOUR NOTE. IF YOU WANT TO FAX A PRELIMINARY NOTE YOU WILL NEED TO TOGGLE THIS TO 'NO' IF YOU DO NOT WANT IT IN YOUR FAXED NOTE.

## 2024-07-08 ENCOUNTER — FOLLOW UP (OUTPATIENT)
Dept: URBAN - METROPOLITAN AREA CLINIC 29 | Facility: CLINIC | Age: 74
End: 2024-07-08

## 2024-07-08 DIAGNOSIS — H40.1131: ICD-10-CM

## 2024-07-08 DIAGNOSIS — H16.223: ICD-10-CM

## 2024-07-08 DIAGNOSIS — Z96.1: ICD-10-CM

## 2024-07-08 PROCEDURE — 92015 DETERMINE REFRACTIVE STATE: CPT

## 2024-07-08 PROCEDURE — 92012 INTRM OPH EXAM EST PATIENT: CPT

## 2024-07-08 ASSESSMENT — TONOMETRY
OS_IOP_MMHG: 12
OD_IOP_MMHG: 12

## 2024-07-08 ASSESSMENT — VISUAL ACUITY
OS_SC: 20/70+1
OD_SC: 20/70

## 2025-02-03 ENCOUNTER — COMPREHENSIVE EXAM (OUTPATIENT)
Age: 75
End: 2025-02-03

## 2025-02-03 DIAGNOSIS — H43.813: ICD-10-CM

## 2025-02-03 DIAGNOSIS — H16.223: ICD-10-CM

## 2025-02-03 DIAGNOSIS — H40.1131: ICD-10-CM

## 2025-02-03 PROCEDURE — 99214 OFFICE O/P EST MOD 30 MIN: CPT

## 2025-02-03 PROCEDURE — 92250 FUNDUS PHOTOGRAPHY W/I&R: CPT

## 2025-02-06 ENCOUNTER — APPOINTMENT (OUTPATIENT)
Dept: URBAN - METROPOLITAN AREA CLINIC 121 | Facility: CLINIC | Age: 75
Setting detail: DERMATOLOGY
End: 2025-02-06

## 2025-02-06 DIAGNOSIS — L82.1 OTHER SEBORRHEIC KERATOSIS: ICD-10-CM

## 2025-02-06 DIAGNOSIS — L81.4 OTHER MELANIN HYPERPIGMENTATION: ICD-10-CM

## 2025-02-06 DIAGNOSIS — L57.0 ACTINIC KERATOSIS: ICD-10-CM

## 2025-02-06 DIAGNOSIS — Z71.89 OTHER SPECIFIED COUNSELING: ICD-10-CM

## 2025-02-06 PROCEDURE — 17003 DESTRUCT PREMALG LES 2-14: CPT

## 2025-02-06 PROCEDURE — ? COUNSELING

## 2025-02-06 PROCEDURE — 17000 DESTRUCT PREMALG LESION: CPT

## 2025-02-06 PROCEDURE — ? LIQUID NITROGEN

## 2025-02-06 PROCEDURE — 99213 OFFICE O/P EST LOW 20 MIN: CPT | Mod: 25

## 2025-02-06 ASSESSMENT — LOCATION ZONE DERM
LOCATION ZONE: ARM
LOCATION ZONE: HAND

## 2025-02-06 ASSESSMENT — LOCATION DETAILED DESCRIPTION DERM
LOCATION DETAILED: LEFT PROXIMAL DORSAL FOREARM
LOCATION DETAILED: RIGHT RADIAL DORSAL HAND
LOCATION DETAILED: LEFT RADIAL DORSAL HAND
LOCATION DETAILED: RIGHT PROXIMAL RADIAL DORSAL FOREARM
LOCATION DETAILED: RIGHT PROXIMAL DORSAL FOREARM
LOCATION DETAILED: LEFT DISTAL DORSAL FOREARM

## 2025-02-06 ASSESSMENT — LOCATION SIMPLE DESCRIPTION DERM
LOCATION SIMPLE: LEFT FOREARM
LOCATION SIMPLE: LEFT HAND
LOCATION SIMPLE: RIGHT HAND
LOCATION SIMPLE: RIGHT FOREARM